# Patient Record
Sex: FEMALE | Race: WHITE | NOT HISPANIC OR LATINO | Employment: OTHER | URBAN - METROPOLITAN AREA
[De-identification: names, ages, dates, MRNs, and addresses within clinical notes are randomized per-mention and may not be internally consistent; named-entity substitution may affect disease eponyms.]

---

## 2023-01-01 ENCOUNTER — APPOINTMENT (OUTPATIENT)
Dept: CARDIOLOGY | Facility: HOSPITAL | Age: 88
DRG: 193 | End: 2023-01-01
Payer: COMMERCIAL

## 2023-01-01 ENCOUNTER — LAB (OUTPATIENT)
Dept: LAB | Facility: LAB | Age: 88
End: 2023-01-01
Payer: COMMERCIAL

## 2023-01-01 ENCOUNTER — APPOINTMENT (OUTPATIENT)
Dept: RADIOLOGY | Facility: HOSPITAL | Age: 88
DRG: 193 | End: 2023-01-01
Payer: COMMERCIAL

## 2023-01-01 ENCOUNTER — DOCUMENTATION (OUTPATIENT)
Dept: INTERNAL MEDICINE | Facility: HOSPITAL | Age: 88
End: 2023-01-01
Payer: COMMERCIAL

## 2023-01-01 ENCOUNTER — TELEPHONE (OUTPATIENT)
Dept: PRIMARY CARE | Facility: CLINIC | Age: 88
End: 2023-01-01
Payer: COMMERCIAL

## 2023-01-01 ENCOUNTER — OFFICE VISIT (OUTPATIENT)
Dept: PRIMARY CARE | Facility: CLINIC | Age: 88
End: 2023-01-01
Payer: COMMERCIAL

## 2023-01-01 ENCOUNTER — HOSPITAL ENCOUNTER (INPATIENT)
Facility: HOSPITAL | Age: 88
LOS: 1 days | DRG: 193 | End: 2023-11-04
Attending: EMERGENCY MEDICINE | Admitting: INTERNAL MEDICINE
Payer: COMMERCIAL

## 2023-01-01 ENCOUNTER — APPOINTMENT (OUTPATIENT)
Dept: PRIMARY CARE | Facility: CLINIC | Age: 88
End: 2023-01-01
Payer: COMMERCIAL

## 2023-01-01 ENCOUNTER — HOSPITAL ENCOUNTER (INPATIENT)
Facility: HOSPITAL | Age: 88
LOS: 3 days | Discharge: HOME | DRG: 193 | End: 2023-10-26
Attending: STUDENT IN AN ORGANIZED HEALTH CARE EDUCATION/TRAINING PROGRAM | Admitting: INTERNAL MEDICINE
Payer: COMMERCIAL

## 2023-01-01 ENCOUNTER — DOCUMENTATION (OUTPATIENT)
Dept: PRIMARY CARE | Facility: CLINIC | Age: 88
End: 2023-01-01
Payer: COMMERCIAL

## 2023-01-01 VITALS
OXYGEN SATURATION: 91 % | RESPIRATION RATE: 18 BRPM | HEIGHT: 62 IN | DIASTOLIC BLOOD PRESSURE: 72 MMHG | HEART RATE: 78 BPM | BODY MASS INDEX: 27.87 KG/M2 | TEMPERATURE: 98.4 F | WEIGHT: 151.46 LBS | SYSTOLIC BLOOD PRESSURE: 156 MMHG

## 2023-01-01 VITALS
SYSTOLIC BLOOD PRESSURE: 125 MMHG | DIASTOLIC BLOOD PRESSURE: 82 MMHG | RESPIRATION RATE: 20 BRPM | TEMPERATURE: 97.3 F | BODY MASS INDEX: 25 KG/M2 | WEIGHT: 141.09 LBS | HEART RATE: 87 BPM | HEIGHT: 63 IN | OXYGEN SATURATION: 94 %

## 2023-01-01 VITALS
OXYGEN SATURATION: 96 % | WEIGHT: 154 LBS | HEART RATE: 68 BPM | RESPIRATION RATE: 17 BRPM | HEIGHT: 62 IN | DIASTOLIC BLOOD PRESSURE: 84 MMHG | BODY MASS INDEX: 28.34 KG/M2 | SYSTOLIC BLOOD PRESSURE: 126 MMHG | TEMPERATURE: 97.3 F

## 2023-01-01 VITALS
SYSTOLIC BLOOD PRESSURE: 150 MMHG | HEART RATE: 62 BPM | OXYGEN SATURATION: 97 % | TEMPERATURE: 97 F | RESPIRATION RATE: 16 BRPM | DIASTOLIC BLOOD PRESSURE: 82 MMHG

## 2023-01-01 VITALS
TEMPERATURE: 98.2 F | OXYGEN SATURATION: 99 % | WEIGHT: 150 LBS | SYSTOLIC BLOOD PRESSURE: 124 MMHG | BODY MASS INDEX: 30.04 KG/M2 | DIASTOLIC BLOOD PRESSURE: 80 MMHG | RESPIRATION RATE: 17 BRPM | HEART RATE: 68 BPM

## 2023-01-01 DIAGNOSIS — R60.0 BILATERAL LEG EDEMA: ICD-10-CM

## 2023-01-01 DIAGNOSIS — Z00.00 ROUTINE GENERAL MEDICAL EXAMINATION AT HEALTH CARE FACILITY: ICD-10-CM

## 2023-01-01 DIAGNOSIS — E78.2 MIXED HYPERLIPIDEMIA: Primary | ICD-10-CM

## 2023-01-01 DIAGNOSIS — M71.21 SYNOVIAL CYST OF RIGHT POPLITEAL SPACE: ICD-10-CM

## 2023-01-01 DIAGNOSIS — K21.9 GASTROESOPHAGEAL REFLUX DISEASE WITHOUT ESOPHAGITIS: ICD-10-CM

## 2023-01-01 DIAGNOSIS — M79.604 PAIN IN RIGHT LEG: ICD-10-CM

## 2023-01-01 DIAGNOSIS — M79.89 RIGHT LEG SWELLING: ICD-10-CM

## 2023-01-01 DIAGNOSIS — Z00.00 HEALTHCARE MAINTENANCE: ICD-10-CM

## 2023-01-01 DIAGNOSIS — I87.2 VENOUS INSUFFICIENCY: ICD-10-CM

## 2023-01-01 DIAGNOSIS — R53.1 WEAKNESS: Primary | ICD-10-CM

## 2023-01-01 DIAGNOSIS — L03.115 CELLULITIS OF RIGHT LOWER EXTREMITY: ICD-10-CM

## 2023-01-01 DIAGNOSIS — J18.9 COMMUNITY ACQUIRED PNEUMONIA, UNSPECIFIED LATERALITY: ICD-10-CM

## 2023-01-01 DIAGNOSIS — I20.9 ANGINA PECTORIS (CMS-HCC): ICD-10-CM

## 2023-01-01 DIAGNOSIS — J18.9 PNEUMONIA, UNSPECIFIED ORGANISM: Primary | ICD-10-CM

## 2023-01-01 DIAGNOSIS — I50.30: ICD-10-CM

## 2023-01-01 DIAGNOSIS — F33.9 DEPRESSION, RECURRENT (CMS-HCC): ICD-10-CM

## 2023-01-01 DIAGNOSIS — Z00.00 ROUTINE GENERAL MEDICAL EXAMINATION AT HEALTH CARE FACILITY: Primary | ICD-10-CM

## 2023-01-01 DIAGNOSIS — R79.89 ELEVATED BRAIN NATRIURETIC PEPTIDE (BNP) LEVEL: ICD-10-CM

## 2023-01-01 DIAGNOSIS — I11.0: ICD-10-CM

## 2023-01-01 DIAGNOSIS — J96.11 CHRONIC RESPIRATORY FAILURE WITH HYPOXIA (MULTI): ICD-10-CM

## 2023-01-01 DIAGNOSIS — F41.9 ANXIETY: ICD-10-CM

## 2023-01-01 DIAGNOSIS — E87.6 HYPOKALEMIA: Primary | ICD-10-CM

## 2023-01-01 DIAGNOSIS — F41.9 ANXIETY: Primary | ICD-10-CM

## 2023-01-01 DIAGNOSIS — R79.89 ELEVATED TROPONIN: Primary | ICD-10-CM

## 2023-01-01 DIAGNOSIS — N39.0 UTI (URINARY TRACT INFECTION), UNCOMPLICATED: ICD-10-CM

## 2023-01-01 DIAGNOSIS — L03.115 CELLULITIS OF RIGHT LOWER EXTREMITY: Primary | ICD-10-CM

## 2023-01-01 DIAGNOSIS — N39.0 RECURRENT UTI: Primary | ICD-10-CM

## 2023-01-01 DIAGNOSIS — J44.9 CHRONIC OBSTRUCTIVE PULMONARY DISEASE, UNSPECIFIED COPD TYPE (MULTI): ICD-10-CM

## 2023-01-01 LAB
ALANINE AMINOTRANSFERASE (SGPT) (U/L) IN SER/PLAS: 8 U/L (ref 7–45)
ALBUMIN (G/DL) IN SER/PLAS: 3.4 G/DL (ref 3.4–5)
ALBUMIN SERPL BCP-MCNC: 3 G/DL (ref 3.4–5)
ALBUMIN SERPL BCP-MCNC: 3.6 G/DL (ref 3.4–5)
ALKALINE PHOSPHATASE (U/L) IN SER/PLAS: 86 U/L (ref 33–136)
ALP SERPL-CCNC: 54 U/L (ref 33–136)
ALP SERPL-CCNC: 79 U/L (ref 33–136)
ALT SERPL W P-5'-P-CCNC: 11 U/L (ref 7–45)
ALT SERPL W P-5'-P-CCNC: 16 U/L (ref 7–45)
AMMONIA PLAS-SCNC: 18 UMOL/L (ref 16–53)
ANION GAP IN SER/PLAS: 16 MMOL/L (ref 10–20)
ANION GAP SERPL CALC-SCNC: 13 MMOL/L (ref 10–20)
ANION GAP SERPL CALC-SCNC: 14 MMOL/L (ref 10–20)
ANION GAP SERPL CALC-SCNC: 14 MMOL/L (ref 10–20)
ANION GAP SERPL CALC-SCNC: 21 MMOL/L (ref 10–20)
APPEARANCE UR: ABNORMAL
ASPARTATE AMINOTRANSFERASE (SGOT) (U/L) IN SER/PLAS: 17 U/L (ref 9–39)
AST SERPL W P-5'-P-CCNC: 12 U/L (ref 9–39)
AST SERPL W P-5'-P-CCNC: 17 U/L (ref 9–39)
ATRIAL RATE: 78 BPM
BACTERIA #/AREA URNS AUTO: ABNORMAL /HPF
BACTERIA SPEC RESP CULT: ABNORMAL
BACTERIA UR CULT: ABNORMAL
BASOPHILS # BLD AUTO: 0.02 X10*3/UL (ref 0–0.1)
BASOPHILS # BLD MANUAL: 0 X10*3/UL (ref 0–0.1)
BASOPHILS NFR BLD AUTO: 0.1 %
BASOPHILS NFR BLD MANUAL: 0 %
BILIRUB SERPL-MCNC: 1 MG/DL (ref 0–1.2)
BILIRUB SERPL-MCNC: 1.2 MG/DL (ref 0–1.2)
BILIRUB UR STRIP.AUTO-MCNC: NEGATIVE MG/DL
BILIRUBIN TOTAL (MG/DL) IN SER/PLAS: 0.7 MG/DL (ref 0–1.2)
BNP SERPL-MCNC: 928 PG/ML (ref 0–99)
BNP SERPL-MCNC: 971 PG/ML (ref 0–99)
BUN SERPL-MCNC: 13 MG/DL (ref 6–23)
BUN SERPL-MCNC: 13 MG/DL (ref 6–23)
BUN SERPL-MCNC: 18 MG/DL (ref 6–23)
BUN SERPL-MCNC: 26 MG/DL (ref 6–23)
BURR CELLS BLD QL SMEAR: ABNORMAL
CALCIUM (MG/DL) IN SER/PLAS: 8.6 MG/DL (ref 8.6–10.3)
CALCIUM SERPL-MCNC: 7.6 MG/DL (ref 8.6–10.3)
CALCIUM SERPL-MCNC: 7.7 MG/DL (ref 8.6–10.3)
CALCIUM SERPL-MCNC: 8.6 MG/DL (ref 8.6–10.3)
CALCIUM SERPL-MCNC: 9.2 MG/DL (ref 8.6–10.3)
CARBON DIOXIDE, TOTAL (MMOL/L) IN SER/PLAS: 22 MMOL/L (ref 21–32)
CARDIAC TROPONIN I PNL SERPL HS: 112 NG/L (ref 0–13)
CARDIAC TROPONIN I PNL SERPL HS: 120 NG/L (ref 0–13)
CARDIAC TROPONIN I PNL SERPL HS: 125 NG/L (ref 0–13)
CARDIAC TROPONIN I PNL SERPL HS: 20 NG/L (ref 0–13)
CARDIAC TROPONIN I PNL SERPL HS: 22 NG/L (ref 0–13)
CHLORIDE (MMOL/L) IN SER/PLAS: 105 MMOL/L (ref 98–107)
CHLORIDE SERPL-SCNC: 104 MMOL/L (ref 98–107)
CHLORIDE SERPL-SCNC: 106 MMOL/L (ref 98–107)
CHLORIDE SERPL-SCNC: 107 MMOL/L (ref 98–107)
CHLORIDE SERPL-SCNC: 108 MMOL/L (ref 98–107)
CHOLESTEROL (MG/DL) IN SER/PLAS: 126 MG/DL (ref 0–199)
CHOLESTEROL IN HDL (MG/DL) IN SER/PLAS: 61.8 MG/DL
CHOLESTEROL/HDL RATIO: 2
CO2 SERPL-SCNC: 18 MMOL/L (ref 21–32)
CO2 SERPL-SCNC: 21 MMOL/L (ref 21–32)
CO2 SERPL-SCNC: 22 MMOL/L (ref 21–32)
CO2 SERPL-SCNC: 24 MMOL/L (ref 21–32)
COLOR UR: ABNORMAL
CREAT SERPL-MCNC: 0.65 MG/DL (ref 0.5–1.05)
CREAT SERPL-MCNC: 0.69 MG/DL (ref 0.5–1.05)
CREAT SERPL-MCNC: 0.82 MG/DL (ref 0.5–1.05)
CREAT SERPL-MCNC: 0.93 MG/DL (ref 0.5–1.05)
CREATININE (MG/DL) IN SER/PLAS: 0.7 MG/DL (ref 0.5–1.05)
EJECTION FRACTION APICAL 4 CHAMBER: 56
EOSINOPHIL # BLD AUTO: 0 X10*3/UL (ref 0–0.4)
EOSINOPHIL # BLD MANUAL: 0.15 X10*3/UL (ref 0–0.4)
EOSINOPHIL NFR BLD AUTO: 0 %
EOSINOPHIL NFR BLD MANUAL: 2 %
ERYTHROCYTE DISTRIBUTION WIDTH (RATIO) BY AUTOMATED COUNT: 14.8 % (ref 11.5–14.5)
ERYTHROCYTE MEAN CORPUSCULAR HEMOGLOBIN CONCENTRATION (G/DL) BY AUTOMATED: 32.3 G/DL (ref 32–36)
ERYTHROCYTE MEAN CORPUSCULAR VOLUME (FL) BY AUTOMATED COUNT: 99 FL (ref 80–100)
ERYTHROCYTE [DISTWIDTH] IN BLOOD BY AUTOMATED COUNT: 14.9 % (ref 11.5–14.5)
ERYTHROCYTE [DISTWIDTH] IN BLOOD BY AUTOMATED COUNT: 15.1 % (ref 11.5–14.5)
ERYTHROCYTE [DISTWIDTH] IN BLOOD BY AUTOMATED COUNT: 15.3 % (ref 11.5–14.5)
ERYTHROCYTES (10*6/UL) IN BLOOD BY AUTOMATED COUNT: 4.4 X10E12/L (ref 4–5.2)
FLUAV RNA RESP QL NAA+PROBE: NOT DETECTED
FLUAV RNA RESP QL NAA+PROBE: NOT DETECTED
FLUBV RNA RESP QL NAA+PROBE: NOT DETECTED
FLUBV RNA RESP QL NAA+PROBE: NOT DETECTED
GFR FEMALE: 81 ML/MIN/1.73M2
GFR SERPL CREATININE-BSD FRML MDRD: 58 ML/MIN/1.73M*2
GFR SERPL CREATININE-BSD FRML MDRD: 68 ML/MIN/1.73M*2
GFR SERPL CREATININE-BSD FRML MDRD: 82 ML/MIN/1.73M*2
GFR SERPL CREATININE-BSD FRML MDRD: 83 ML/MIN/1.73M*2
GLUCOSE (MG/DL) IN SER/PLAS: 96 MG/DL (ref 74–99)
GLUCOSE SERPL-MCNC: 102 MG/DL (ref 74–99)
GLUCOSE SERPL-MCNC: 111 MG/DL (ref 74–99)
GLUCOSE SERPL-MCNC: 118 MG/DL (ref 74–99)
GLUCOSE SERPL-MCNC: 86 MG/DL (ref 74–99)
GLUCOSE UR STRIP.AUTO-MCNC: NEGATIVE MG/DL
GRAM STN SPEC: ABNORMAL
HCT VFR BLD AUTO: 34.4 % (ref 36–46)
HCT VFR BLD AUTO: 35.8 % (ref 36–46)
HCT VFR BLD AUTO: 36.4 % (ref 36–46)
HCT VFR BLD AUTO: 38.7 % (ref 36–46)
HCT VFR BLD AUTO: 40.1 % (ref 36–46)
HEMATOCRIT (%) IN BLOOD BY AUTOMATED COUNT: 43.4 % (ref 36–46)
HEMOGLOBIN (G/DL) IN BLOOD: 14 G/DL (ref 12–16)
HGB BLD-MCNC: 11.2 G/DL (ref 12–16)
HGB BLD-MCNC: 11.6 G/DL (ref 12–16)
HGB BLD-MCNC: 11.8 G/DL (ref 12–16)
HGB BLD-MCNC: 12.7 G/DL (ref 12–16)
HGB BLD-MCNC: 12.7 G/DL (ref 12–16)
HOLD SPECIMEN: NORMAL
IMM GRANULOCYTES # BLD AUTO: 0.05 X10*3/UL (ref 0–0.5)
IMM GRANULOCYTES # BLD AUTO: 0.26 X10*3/UL (ref 0–0.5)
IMM GRANULOCYTES NFR BLD AUTO: 0.7 % (ref 0–0.9)
IMM GRANULOCYTES NFR BLD AUTO: 1.3 % (ref 0–0.9)
INR PPP: 1.1 (ref 0.9–1.1)
KETONES UR STRIP.AUTO-MCNC: NEGATIVE MG/DL
LDL: 43 MG/DL (ref 0–99)
LEGIONELLA AG UR QL: NEGATIVE
LEUKOCYTE ESTERASE UR QL STRIP.AUTO: ABNORMAL
LEUKOCYTES (10*3/UL) IN BLOOD BY AUTOMATED COUNT: 7 X10E9/L (ref 4.4–11.3)
LYMPHOCYTES # BLD AUTO: 0.36 X10*3/UL (ref 0.8–3)
LYMPHOCYTES # BLD MANUAL: 1 X10*3/UL (ref 0.8–3)
LYMPHOCYTES NFR BLD AUTO: 1.7 %
LYMPHOCYTES NFR BLD MANUAL: 13 %
MAGNESIUM SERPL-MCNC: 1.65 MG/DL (ref 1.6–2.4)
MAGNESIUM SERPL-MCNC: 1.67 MG/DL (ref 1.6–2.4)
MAGNESIUM SERPL-MCNC: 2.32 MG/DL (ref 1.6–2.4)
MCH RBC QN AUTO: 30.8 PG (ref 26–34)
MCH RBC QN AUTO: 30.9 PG (ref 26–34)
MCH RBC QN AUTO: 31 PG (ref 26–34)
MCH RBC QN AUTO: 31.2 PG (ref 26–34)
MCH RBC QN AUTO: 31.4 PG (ref 26–34)
MCHC RBC AUTO-ENTMCNC: 31.7 G/DL (ref 32–36)
MCHC RBC AUTO-ENTMCNC: 32.4 G/DL (ref 32–36)
MCHC RBC AUTO-ENTMCNC: 32.4 G/DL (ref 32–36)
MCHC RBC AUTO-ENTMCNC: 32.6 G/DL (ref 32–36)
MCHC RBC AUTO-ENTMCNC: 32.8 G/DL (ref 32–36)
MCV RBC AUTO: 95 FL (ref 80–100)
MCV RBC AUTO: 97 FL (ref 80–100)
MCV RBC AUTO: 97 FL (ref 80–100)
MONOCYTES # BLD AUTO: 0.61 X10*3/UL (ref 0.05–0.8)
MONOCYTES # BLD MANUAL: 0.31 X10*3/UL (ref 0.05–0.8)
MONOCYTES NFR BLD AUTO: 3 %
MONOCYTES NFR BLD MANUAL: 4 %
MRSA DNA SPEC QL NAA+PROBE: NOT DETECTED
NEUTROPHILS # BLD AUTO: 19.34 X10*3/UL (ref 1.6–5.5)
NEUTROPHILS # BLD MANUAL: 6.24 X10*3/UL (ref 1.6–5.5)
NEUTROPHILS NFR BLD AUTO: 93.9 %
NEUTS BAND # BLD MANUAL: 0.08 X10*3/UL (ref 0–0.5)
NEUTS BAND NFR BLD MANUAL: 1 %
NEUTS SEG # BLD MANUAL: 6.16 X10*3/UL (ref 1.6–5)
NEUTS SEG NFR BLD MANUAL: 80 %
NITRITE UR QL STRIP.AUTO: NEGATIVE
NRBC BLD-RTO: 0 /100 WBCS (ref 0–0)
OVALOCYTES BLD QL SMEAR: ABNORMAL
P AXIS: 44 DEGREES
P OFFSET: 189 MS
P ONSET: 147 MS
PH UR STRIP.AUTO: 5 [PH]
PHOSPHATE SERPL-MCNC: 2.2 MG/DL (ref 2.5–4.9)
PHOSPHATE SERPL-MCNC: 2.9 MG/DL (ref 2.5–4.9)
PLATELET # BLD AUTO: 220 X10*3/UL (ref 150–450)
PLATELET # BLD AUTO: 222 X10*3/UL (ref 150–450)
PLATELET # BLD AUTO: 223 X10*3/UL (ref 150–450)
PLATELET # BLD AUTO: 246 X10*3/UL (ref 150–450)
PLATELET # BLD AUTO: 288 X10*3/UL (ref 150–450)
PLATELETS (10*3/UL) IN BLOOD AUTOMATED COUNT: 323 X10E9/L (ref 150–450)
PMV BLD AUTO: 11.3 FL (ref 7.5–11.5)
PMV BLD AUTO: 11.4 FL (ref 7.5–11.5)
PMV BLD AUTO: 11.4 FL (ref 7.5–11.5)
PMV BLD AUTO: 11.6 FL (ref 7.5–11.5)
POTASSIUM (MMOL/L) IN SER/PLAS: 3.6 MMOL/L (ref 3.5–5.3)
POTASSIUM SERPL-SCNC: 3 MMOL/L (ref 3.5–5.3)
POTASSIUM SERPL-SCNC: 3.1 MMOL/L (ref 3.5–5.3)
POTASSIUM SERPL-SCNC: 3.5 MMOL/L (ref 3.5–5.3)
POTASSIUM SERPL-SCNC: 3.7 MMOL/L (ref 3.5–5.3)
POTASSIUM SERPL-SCNC: 4 MMOL/L (ref 3.5–5.3)
PR INTERVAL: 144 MS
PROCALCITONIN SERPL-MCNC: 0.38 NG/ML
PROT SERPL-MCNC: 5.9 G/DL (ref 6.4–8.2)
PROT SERPL-MCNC: 6.4 G/DL (ref 6.4–8.2)
PROT UR STRIP.AUTO-MCNC: ABNORMAL MG/DL
PROTEIN TOTAL: 6.3 G/DL (ref 6.4–8.2)
PROTHROMBIN TIME: 11.9 SECONDS (ref 9.8–12.8)
Q ONSET: 219 MS
QRS COUNT: 13 BEATS
QRS DURATION: 92 MS
QT INTERVAL: 408 MS
QTC CALCULATION(BAZETT): 465 MS
QTC FREDERICIA: 445 MS
R AXIS: 21 DEGREES
RBC # BLD AUTO: 3.61 X10*6/UL (ref 4–5.2)
RBC # BLD AUTO: 3.76 X10*6/UL (ref 4–5.2)
RBC # BLD AUTO: 3.76 X10*6/UL (ref 4–5.2)
RBC # BLD AUTO: 4.07 X10*6/UL (ref 4–5.2)
RBC # BLD AUTO: 4.12 X10*6/UL (ref 4–5.2)
RBC # UR STRIP.AUTO: ABNORMAL /UL
RBC #/AREA URNS AUTO: ABNORMAL /HPF
RBC MORPH BLD: ABNORMAL
RSV RNA RESP QL NAA+PROBE: NOT DETECTED
S PNEUM AG UR QL: NEGATIVE
SARS-COV-2 RNA RESP QL NAA+PROBE: NOT DETECTED
SARS-COV-2 RNA RESP QL NAA+PROBE: NOT DETECTED
SODIUM (MMOL/L) IN SER/PLAS: 139 MMOL/L (ref 136–145)
SODIUM SERPL-SCNC: 139 MMOL/L (ref 136–145)
SODIUM SERPL-SCNC: 139 MMOL/L (ref 136–145)
SODIUM SERPL-SCNC: 140 MMOL/L (ref 136–145)
SODIUM SERPL-SCNC: 140 MMOL/L (ref 136–145)
SP GR UR STRIP.AUTO: 1.02
SQUAMOUS #/AREA URNS AUTO: ABNORMAL /HPF
T AXIS: 41 DEGREES
T OFFSET: 423 MS
TOTAL CELLS COUNTED BLD: 100
TRIGLYCERIDE (MG/DL) IN SER/PLAS: 105 MG/DL (ref 0–149)
UREA NITROGEN (MG/DL) IN SER/PLAS: 9 MG/DL (ref 6–23)
UROBILINOGEN UR STRIP.AUTO-MCNC: <2 MG/DL
VENTRICULAR RATE: 78 BPM
VLDL: 21 MG/DL (ref 0–40)
WBC # BLD AUTO: 20.6 X10*3/UL (ref 4.4–11.3)
WBC # BLD AUTO: 4.3 X10*3/UL (ref 4.4–11.3)
WBC # BLD AUTO: 4.8 X10*3/UL (ref 4.4–11.3)
WBC # BLD AUTO: 5.1 X10*3/UL (ref 4.4–11.3)
WBC # BLD AUTO: 7.7 X10*3/UL (ref 4.4–11.3)
WBC #/AREA URNS AUTO: ABNORMAL /HPF

## 2023-01-01 PROCEDURE — 83880 ASSAY OF NATRIURETIC PEPTIDE: CPT | Performed by: PHYSICIAN ASSISTANT

## 2023-01-01 PROCEDURE — 2500000001 HC RX 250 WO HCPCS SELF ADMINISTERED DRUGS (ALT 637 FOR MEDICARE OP): Performed by: INTERNAL MEDICINE

## 2023-01-01 PROCEDURE — 82140 ASSAY OF AMMONIA: CPT | Performed by: STUDENT IN AN ORGANIZED HEALTH CARE EDUCATION/TRAINING PROGRAM

## 2023-01-01 PROCEDURE — 97161 PT EVAL LOW COMPLEX 20 MIN: CPT | Mod: GP

## 2023-01-01 PROCEDURE — 1170F FXNL STATUS ASSESSED: CPT | Performed by: FAMILY MEDICINE

## 2023-01-01 PROCEDURE — 80053 COMPREHEN METABOLIC PANEL: CPT

## 2023-01-01 PROCEDURE — 36415 COLL VENOUS BLD VENIPUNCTURE: CPT | Performed by: INTERNAL MEDICINE

## 2023-01-01 PROCEDURE — 85025 COMPLETE CBC W/AUTO DIFF WBC: CPT

## 2023-01-01 PROCEDURE — 1160F RVW MEDS BY RX/DR IN RCRD: CPT

## 2023-01-01 PROCEDURE — 2500000004 HC RX 250 GENERAL PHARMACY W/ HCPCS (ALT 636 FOR OP/ED): Performed by: INTERNAL MEDICINE

## 2023-01-01 PROCEDURE — 2500000005 HC RX 250 GENERAL PHARMACY W/O HCPCS: Performed by: STUDENT IN AN ORGANIZED HEALTH CARE EDUCATION/TRAINING PROGRAM

## 2023-01-01 PROCEDURE — 87205 SMEAR GRAM STAIN: CPT | Mod: STJLAB | Performed by: INTERNAL MEDICINE

## 2023-01-01 PROCEDURE — 97165 OT EVAL LOW COMPLEX 30 MIN: CPT | Mod: GO

## 2023-01-01 PROCEDURE — 83735 ASSAY OF MAGNESIUM: CPT | Performed by: STUDENT IN AN ORGANIZED HEALTH CARE EDUCATION/TRAINING PROGRAM

## 2023-01-01 PROCEDURE — 99285 EMERGENCY DEPT VISIT HI MDM: CPT | Performed by: EMERGENCY MEDICINE

## 2023-01-01 PROCEDURE — 85027 COMPLETE CBC AUTOMATED: CPT | Performed by: STUDENT IN AN ORGANIZED HEALTH CARE EDUCATION/TRAINING PROGRAM

## 2023-01-01 PROCEDURE — 96361 HYDRATE IV INFUSION ADD-ON: CPT

## 2023-01-01 PROCEDURE — 80061 LIPID PANEL: CPT

## 2023-01-01 PROCEDURE — 93010 ELECTROCARDIOGRAM REPORT: CPT | Performed by: PHYSICIAN ASSISTANT

## 2023-01-01 PROCEDURE — 99233 SBSQ HOSP IP/OBS HIGH 50: CPT | Performed by: STUDENT IN AN ORGANIZED HEALTH CARE EDUCATION/TRAINING PROGRAM

## 2023-01-01 PROCEDURE — 70450 CT HEAD/BRAIN W/O DYE: CPT | Performed by: RADIOLOGY

## 2023-01-01 PROCEDURE — 3074F SYST BP LT 130 MM HG: CPT

## 2023-01-01 PROCEDURE — 85027 COMPLETE CBC AUTOMATED: CPT

## 2023-01-01 PROCEDURE — 85610 PROTHROMBIN TIME: CPT | Performed by: STUDENT IN AN ORGANIZED HEALTH CARE EDUCATION/TRAINING PROGRAM

## 2023-01-01 PROCEDURE — 1159F MED LIST DOCD IN RCRD: CPT

## 2023-01-01 PROCEDURE — 2500000005 HC RX 250 GENERAL PHARMACY W/O HCPCS: Performed by: EMERGENCY MEDICINE

## 2023-01-01 PROCEDURE — 87899 AGENT NOS ASSAY W/OPTIC: CPT | Performed by: INTERNAL MEDICINE

## 2023-01-01 PROCEDURE — 83880 ASSAY OF NATRIURETIC PEPTIDE: CPT

## 2023-01-01 PROCEDURE — 87186 SC STD MICRODIL/AGAR DIL: CPT | Mod: STJLAB | Performed by: PHYSICIAN ASSISTANT

## 2023-01-01 PROCEDURE — 85027 COMPLETE CBC AUTOMATED: CPT | Performed by: INTERNAL MEDICINE

## 2023-01-01 PROCEDURE — 84484 ASSAY OF TROPONIN QUANT: CPT | Performed by: STUDENT IN AN ORGANIZED HEALTH CARE EDUCATION/TRAINING PROGRAM

## 2023-01-01 PROCEDURE — 3074F SYST BP LT 130 MM HG: CPT | Performed by: FAMILY MEDICINE

## 2023-01-01 PROCEDURE — 84145 PROCALCITONIN (PCT): CPT | Performed by: INTERNAL MEDICINE

## 2023-01-01 PROCEDURE — 2500000004 HC RX 250 GENERAL PHARMACY W/ HCPCS (ALT 636 FOR OP/ED)

## 2023-01-01 PROCEDURE — 1160F RVW MEDS BY RX/DR IN RCRD: CPT | Performed by: FAMILY MEDICINE

## 2023-01-01 PROCEDURE — 3079F DIAST BP 80-89 MM HG: CPT

## 2023-01-01 PROCEDURE — 1036F TOBACCO NON-USER: CPT

## 2023-01-01 PROCEDURE — 93000 ELECTROCARDIOGRAM COMPLETE: CPT | Performed by: FAMILY MEDICINE

## 2023-01-01 PROCEDURE — 81001 URINALYSIS AUTO W/SCOPE: CPT | Performed by: PHYSICIAN ASSISTANT

## 2023-01-01 PROCEDURE — 87899 AGENT NOS ASSAY W/OPTIC: CPT | Mod: STJLAB | Performed by: INTERNAL MEDICINE

## 2023-01-01 PROCEDURE — 96375 TX/PRO/DX INJ NEW DRUG ADDON: CPT

## 2023-01-01 PROCEDURE — 2500000002 HC RX 250 W HCPCS SELF ADMINISTERED DRUGS (ALT 637 FOR MEDICARE OP, ALT 636 FOR OP/ED): Performed by: EMERGENCY MEDICINE

## 2023-01-01 PROCEDURE — 84484 ASSAY OF TROPONIN QUANT: CPT | Performed by: PHYSICIAN ASSISTANT

## 2023-01-01 PROCEDURE — 3079F DIAST BP 80-89 MM HG: CPT | Performed by: FAMILY MEDICINE

## 2023-01-01 PROCEDURE — 73564 X-RAY EXAM KNEE 4 OR MORE: CPT | Mod: RIGHT SIDE | Performed by: RADIOLOGY

## 2023-01-01 PROCEDURE — 97535 SELF CARE MNGMENT TRAINING: CPT | Mod: GO

## 2023-01-01 PROCEDURE — 71046 X-RAY EXAM CHEST 2 VIEWS: CPT | Mod: FY,FR

## 2023-01-01 PROCEDURE — 70450 CT HEAD/BRAIN W/O DYE: CPT | Mod: MG

## 2023-01-01 PROCEDURE — 87449 NOS EACH ORGANISM AG IA: CPT | Mod: STJLAB | Performed by: INTERNAL MEDICINE

## 2023-01-01 PROCEDURE — 87637 SARSCOV2&INF A&B&RSV AMP PRB: CPT | Performed by: INTERNAL MEDICINE

## 2023-01-01 PROCEDURE — 96367 TX/PROPH/DG ADDL SEQ IV INF: CPT

## 2023-01-01 PROCEDURE — 2550000001 HC RX 255 CONTRASTS: Performed by: PHYSICIAN ASSISTANT

## 2023-01-01 PROCEDURE — 80048 BASIC METABOLIC PNL TOTAL CA: CPT | Performed by: INTERNAL MEDICINE

## 2023-01-01 PROCEDURE — 96372 THER/PROPH/DIAG INJ SC/IM: CPT | Performed by: INTERNAL MEDICINE

## 2023-01-01 PROCEDURE — 87636 SARSCOV2 & INF A&B AMP PRB: CPT

## 2023-01-01 PROCEDURE — 1125F AMNT PAIN NOTED PAIN PRSNT: CPT

## 2023-01-01 PROCEDURE — 36415 COLL VENOUS BLD VENIPUNCTURE: CPT | Performed by: STUDENT IN AN ORGANIZED HEALTH CARE EDUCATION/TRAINING PROGRAM

## 2023-01-01 PROCEDURE — 2500000004 HC RX 250 GENERAL PHARMACY W/ HCPCS (ALT 636 FOR OP/ED): Performed by: STUDENT IN AN ORGANIZED HEALTH CARE EDUCATION/TRAINING PROGRAM

## 2023-01-01 PROCEDURE — 71275 CT ANGIOGRAPHY CHEST: CPT | Mod: FOREIGN READ | Performed by: RADIOLOGY

## 2023-01-01 PROCEDURE — 36415 COLL VENOUS BLD VENIPUNCTURE: CPT

## 2023-01-01 PROCEDURE — 96365 THER/PROPH/DIAG IV INF INIT: CPT

## 2023-01-01 PROCEDURE — 99239 HOSP IP/OBS DSCHRG MGMT >30: CPT | Performed by: INTERNAL MEDICINE

## 2023-01-01 PROCEDURE — 93306 TTE W/DOPPLER COMPLETE: CPT

## 2023-01-01 PROCEDURE — 93005 ELECTROCARDIOGRAM TRACING: CPT

## 2023-01-01 PROCEDURE — 71046 X-RAY EXAM CHEST 2 VIEWS: CPT | Mod: FOREIGN READ | Performed by: RADIOLOGY

## 2023-01-01 PROCEDURE — 97116 GAIT TRAINING THERAPY: CPT | Mod: GP,CQ

## 2023-01-01 PROCEDURE — 99213 OFFICE O/P EST LOW 20 MIN: CPT

## 2023-01-01 PROCEDURE — 99239 HOSP IP/OBS DSCHRG MGMT >30: CPT | Performed by: STUDENT IN AN ORGANIZED HEALTH CARE EDUCATION/TRAINING PROGRAM

## 2023-01-01 PROCEDURE — 1100000001 HC PRIVATE ROOM DAILY

## 2023-01-01 PROCEDURE — 51701 INSERT BLADDER CATHETER: CPT

## 2023-01-01 PROCEDURE — 99285 EMERGENCY DEPT VISIT HI MDM: CPT | Performed by: STUDENT IN AN ORGANIZED HEALTH CARE EDUCATION/TRAINING PROGRAM

## 2023-01-01 PROCEDURE — 2500000004 HC RX 250 GENERAL PHARMACY W/ HCPCS (ALT 636 FOR OP/ED): Performed by: PHYSICIAN ASSISTANT

## 2023-01-01 PROCEDURE — 84132 ASSAY OF SERUM POTASSIUM: CPT | Performed by: STUDENT IN AN ORGANIZED HEALTH CARE EDUCATION/TRAINING PROGRAM

## 2023-01-01 PROCEDURE — 71275 CT ANGIOGRAPHY CHEST: CPT | Mod: MG

## 2023-01-01 PROCEDURE — 99285 EMERGENCY DEPT VISIT HI MDM: CPT | Mod: 25 | Performed by: EMERGENCY MEDICINE

## 2023-01-01 PROCEDURE — 83735 ASSAY OF MAGNESIUM: CPT

## 2023-01-01 PROCEDURE — 97110 THERAPEUTIC EXERCISES: CPT | Mod: GP,CQ

## 2023-01-01 PROCEDURE — 99215 OFFICE O/P EST HI 40 MIN: CPT

## 2023-01-01 PROCEDURE — 99222 1ST HOSP IP/OBS MODERATE 55: CPT | Performed by: INTERNAL MEDICINE

## 2023-01-01 PROCEDURE — 87641 MR-STAPH DNA AMP PROBE: CPT | Performed by: INTERNAL MEDICINE

## 2023-01-01 PROCEDURE — 84100 ASSAY OF PHOSPHORUS: CPT | Performed by: STUDENT IN AN ORGANIZED HEALTH CARE EDUCATION/TRAINING PROGRAM

## 2023-01-01 PROCEDURE — 85027 COMPLETE CBC AUTOMATED: CPT | Performed by: PHYSICIAN ASSISTANT

## 2023-01-01 PROCEDURE — 80053 COMPREHEN METABOLIC PANEL: CPT | Performed by: PHYSICIAN ASSISTANT

## 2023-01-01 PROCEDURE — 3077F SYST BP >= 140 MM HG: CPT

## 2023-01-01 PROCEDURE — 71045 X-RAY EXAM CHEST 1 VIEW: CPT | Performed by: RADIOLOGY

## 2023-01-01 PROCEDURE — G0439 PPPS, SUBSEQ VISIT: HCPCS | Performed by: FAMILY MEDICINE

## 2023-01-01 PROCEDURE — 36415 COLL VENOUS BLD VENIPUNCTURE: CPT | Performed by: PHYSICIAN ASSISTANT

## 2023-01-01 PROCEDURE — 1157F ADVNC CARE PLAN IN RCRD: CPT

## 2023-01-01 PROCEDURE — 1159F MED LIST DOCD IN RCRD: CPT | Performed by: FAMILY MEDICINE

## 2023-01-01 PROCEDURE — 71045 X-RAY EXAM CHEST 1 VIEW: CPT | Mod: FY

## 2023-01-01 PROCEDURE — 85007 BL SMEAR W/DIFF WBC COUNT: CPT | Performed by: PHYSICIAN ASSISTANT

## 2023-01-01 PROCEDURE — 93971 EXTREMITY STUDY: CPT

## 2023-01-01 PROCEDURE — 73564 X-RAY EXAM KNEE 4 OR MORE: CPT | Mod: RT,FY

## 2023-01-01 PROCEDURE — 99285 EMERGENCY DEPT VISIT HI MDM: CPT | Performed by: PHYSICIAN ASSISTANT

## 2023-01-01 PROCEDURE — 93306 TTE W/DOPPLER COMPLETE: CPT | Performed by: INTERNAL MEDICINE

## 2023-01-01 PROCEDURE — 84484 ASSAY OF TROPONIN QUANT: CPT

## 2023-01-01 PROCEDURE — 96365 THER/PROPH/DIAG IV INF INIT: CPT | Mod: 59

## 2023-01-01 PROCEDURE — 94640 AIRWAY INHALATION TREATMENT: CPT

## 2023-01-01 PROCEDURE — 93971 EXTREMITY STUDY: CPT | Performed by: SURGERY

## 2023-01-01 RX ORDER — IPRATROPIUM BROMIDE AND ALBUTEROL SULFATE 2.5; .5 MG/3ML; MG/3ML
3 SOLUTION RESPIRATORY (INHALATION)
Status: DISCONTINUED | OUTPATIENT
Start: 2023-01-01 | End: 2023-01-01

## 2023-01-01 RX ORDER — VANCOMYCIN HYDROCHLORIDE 1 G/200ML
1 INJECTION, SOLUTION INTRAVENOUS EVERY 24 HOURS
Status: DISCONTINUED | OUTPATIENT
Start: 2023-01-01 | End: 2023-01-01 | Stop reason: HOSPADM

## 2023-01-01 RX ORDER — GUAIFENESIN/DEXTROMETHORPHAN 100-10MG/5
10 SYRUP ORAL EVERY 4 HOURS PRN
Status: DISCONTINUED | OUTPATIENT
Start: 2023-01-01 | End: 2023-01-01 | Stop reason: HOSPADM

## 2023-01-01 RX ORDER — AMOXICILLIN AND CLAVULANATE POTASSIUM 875; 125 MG/1; MG/1
1 TABLET, FILM COATED ORAL 2 TIMES DAILY
Qty: 10 TABLET | Refills: 0 | Status: SHIPPED | OUTPATIENT
Start: 2023-01-01 | End: 2023-01-01 | Stop reason: HOSPADM

## 2023-01-01 RX ORDER — POTASSIUM CHLORIDE 750 MG/1
10 TABLET, EXTENDED RELEASE ORAL DAILY
Qty: 90 TABLET | Refills: 3 | Status: SHIPPED | OUTPATIENT
Start: 2023-01-01 | End: 2023-01-01 | Stop reason: HOSPADM

## 2023-01-01 RX ORDER — ATORVASTATIN CALCIUM 40 MG/1
TABLET, FILM COATED ORAL
Qty: 100 TABLET | Refills: 2 | Status: SHIPPED | OUTPATIENT
Start: 2023-01-01 | End: 2023-01-01 | Stop reason: HOSPADM

## 2023-01-01 RX ORDER — ENOXAPARIN SODIUM 100 MG/ML
40 INJECTION SUBCUTANEOUS EVERY 24 HOURS
Status: DISCONTINUED | OUTPATIENT
Start: 2023-01-01 | End: 2023-01-01 | Stop reason: HOSPADM

## 2023-01-01 RX ORDER — VANCOMYCIN HYDROCHLORIDE 1 G/200ML
1000 INJECTION, SOLUTION INTRAVENOUS ONCE
Status: COMPLETED | OUTPATIENT
Start: 2023-01-01 | End: 2023-01-01

## 2023-01-01 RX ORDER — MIRTAZAPINE 15 MG/1
15 TABLET, FILM COATED ORAL NIGHTLY
Status: DISCONTINUED | OUTPATIENT
Start: 2023-01-01 | End: 2023-01-01 | Stop reason: HOSPADM

## 2023-01-01 RX ORDER — CEPHALEXIN 500 MG/1
500 CAPSULE ORAL 4 TIMES DAILY
Qty: 28 CAPSULE | Refills: 0 | Status: SHIPPED | OUTPATIENT
Start: 2023-01-01 | End: 2023-01-01

## 2023-01-01 RX ORDER — FERROUS SULFATE 325(65) MG
1 TABLET ORAL DAILY
COMMUNITY
End: 2023-01-01 | Stop reason: HOSPADM

## 2023-01-01 RX ORDER — PANTOPRAZOLE SODIUM 40 MG/1
40 TABLET, DELAYED RELEASE ORAL
Status: DISCONTINUED | OUTPATIENT
Start: 2023-01-01 | End: 2023-01-01 | Stop reason: HOSPADM

## 2023-01-01 RX ORDER — METOPROLOL SUCCINATE 25 MG/1
25 TABLET, EXTENDED RELEASE ORAL NIGHTLY
Status: DISCONTINUED | OUTPATIENT
Start: 2023-01-01 | End: 2023-01-01 | Stop reason: HOSPADM

## 2023-01-01 RX ORDER — POTASSIUM CHLORIDE 20 MEQ/1
40 TABLET, EXTENDED RELEASE ORAL ONCE
Status: COMPLETED | OUTPATIENT
Start: 2023-01-01 | End: 2023-01-01

## 2023-01-01 RX ORDER — ENOXAPARIN SODIUM 100 MG/ML
40 INJECTION SUBCUTANEOUS DAILY
Status: DISCONTINUED | OUTPATIENT
Start: 2023-01-01 | End: 2023-01-01 | Stop reason: HOSPADM

## 2023-01-01 RX ORDER — PAROXETINE 10 MG/1
10 TABLET, FILM COATED ORAL DAILY
Qty: 90 TABLET | Refills: 1 | Status: SHIPPED | OUTPATIENT
Start: 2023-01-01 | End: 2023-01-01 | Stop reason: SDUPTHER

## 2023-01-01 RX ORDER — ATORVASTATIN CALCIUM 40 MG/1
40 TABLET, FILM COATED ORAL DAILY
Status: DISCONTINUED | OUTPATIENT
Start: 2023-01-01 | End: 2023-01-01 | Stop reason: HOSPADM

## 2023-01-01 RX ORDER — ALBUTEROL SULFATE 0.83 MG/ML
2.5 SOLUTION RESPIRATORY (INHALATION) EVERY 4 HOURS PRN
Status: DISCONTINUED | OUTPATIENT
Start: 2023-01-01 | End: 2023-01-01 | Stop reason: HOSPADM

## 2023-01-01 RX ORDER — FERROUS SULFATE 325(65) MG
1 TABLET ORAL DAILY
Status: DISCONTINUED | OUTPATIENT
Start: 2023-01-01 | End: 2023-01-01 | Stop reason: HOSPADM

## 2023-01-01 RX ORDER — LANOLIN ALCOHOL/MO/W.PET/CERES
400 CREAM (GRAM) TOPICAL ONCE
Status: COMPLETED | OUTPATIENT
Start: 2023-01-01 | End: 2023-01-01

## 2023-01-01 RX ORDER — POTASSIUM CHLORIDE 20 MEQ/1
10 TABLET, EXTENDED RELEASE ORAL DAILY
Status: DISCONTINUED | OUTPATIENT
Start: 2023-01-01 | End: 2023-01-01 | Stop reason: HOSPADM

## 2023-01-01 RX ORDER — PANTOPRAZOLE SODIUM 40 MG/1
40 TABLET, DELAYED RELEASE ORAL
Qty: 180 TABLET | Refills: 3 | Status: SHIPPED | OUTPATIENT
Start: 2023-01-01 | End: 2023-01-01 | Stop reason: HOSPADM

## 2023-01-01 RX ORDER — SODIUM CHLORIDE, SODIUM LACTATE, POTASSIUM CHLORIDE, CALCIUM CHLORIDE 600; 310; 30; 20 MG/100ML; MG/100ML; MG/100ML; MG/100ML
75 INJECTION, SOLUTION INTRAVENOUS CONTINUOUS
Status: DISCONTINUED | OUTPATIENT
Start: 2023-01-01 | End: 2023-01-01 | Stop reason: HOSPADM

## 2023-01-01 RX ORDER — PAROXETINE 10 MG/1
1 TABLET, FILM COATED ORAL DAILY
COMMUNITY
Start: 2022-06-27 | End: 2023-01-01 | Stop reason: SDUPTHER

## 2023-01-01 RX ORDER — ALBUTEROL SULFATE 90 UG/1
2 AEROSOL, METERED RESPIRATORY (INHALATION) 4 TIMES DAILY PRN
COMMUNITY
End: 2023-01-01 | Stop reason: ENTERED-IN-ERROR

## 2023-01-01 RX ORDER — PAROXETINE 10 MG/1
10 TABLET, FILM COATED ORAL NIGHTLY
COMMUNITY
Start: 2020-03-10 | End: 2023-01-01 | Stop reason: HOSPADM

## 2023-01-01 RX ORDER — ONDANSETRON HYDROCHLORIDE 2 MG/ML
4 INJECTION, SOLUTION INTRAVENOUS ONCE
Status: COMPLETED | OUTPATIENT
Start: 2023-01-01 | End: 2023-01-01

## 2023-01-01 RX ORDER — SODIUM CHLORIDE 9 MG/ML
100 INJECTION, SOLUTION INTRAVENOUS CONTINUOUS
Status: ACTIVE | OUTPATIENT
Start: 2023-01-01 | End: 2023-01-01

## 2023-01-01 RX ORDER — ONDANSETRON HYDROCHLORIDE 2 MG/ML
INJECTION, SOLUTION INTRAVENOUS
Status: COMPLETED
Start: 2023-01-01 | End: 2023-01-01

## 2023-01-01 RX ORDER — IPRATROPIUM BROMIDE AND ALBUTEROL SULFATE 2.5; .5 MG/3ML; MG/3ML
3 SOLUTION RESPIRATORY (INHALATION) ONCE
Status: COMPLETED | OUTPATIENT
Start: 2023-01-01 | End: 2023-01-01

## 2023-01-01 RX ORDER — PAROXETINE 10 MG/1
10 TABLET, FILM COATED ORAL NIGHTLY
Status: DISCONTINUED | OUTPATIENT
Start: 2023-01-01 | End: 2023-01-01 | Stop reason: HOSPADM

## 2023-01-01 RX ORDER — POTASSIUM PHOSPHATE, MONOBASIC AND POTASSIUM PHOSPHATE, DIBASIC 224; 236 MG/ML; MG/ML
15 INJECTION, SOLUTION INTRAVENOUS ONCE
Status: COMPLETED | OUTPATIENT
Start: 2023-01-01 | End: 2023-01-01

## 2023-01-01 RX ORDER — CEFTRIAXONE 2 G/50ML
2 INJECTION, SOLUTION INTRAVENOUS EVERY 24 HOURS
Status: DISCONTINUED | OUTPATIENT
Start: 2023-01-01 | End: 2023-01-01 | Stop reason: HOSPADM

## 2023-01-01 RX ORDER — ONDANSETRON HYDROCHLORIDE 2 MG/ML
4 INJECTION, SOLUTION INTRAVENOUS EVERY 6 HOURS PRN
Status: DISCONTINUED | OUTPATIENT
Start: 2023-01-01 | End: 2023-01-01 | Stop reason: HOSPADM

## 2023-01-01 RX ORDER — CEFTRIAXONE 1 G/50ML
1 INJECTION, SOLUTION INTRAVENOUS ONCE
Status: COMPLETED | OUTPATIENT
Start: 2023-01-01 | End: 2023-01-01

## 2023-01-01 RX ORDER — POTASSIUM CHLORIDE 750 MG/1
10 TABLET, EXTENDED RELEASE ORAL DAILY
COMMUNITY
Start: 2023-01-01 | End: 2023-01-01

## 2023-01-01 RX ORDER — MIRTAZAPINE 15 MG/1
1 TABLET, FILM COATED ORAL NIGHTLY
COMMUNITY
Start: 2022-02-21 | End: 2023-01-01 | Stop reason: HOSPADM

## 2023-01-01 RX ORDER — PAROXETINE 10 MG/1
10 TABLET, FILM COATED ORAL DAILY
Qty: 90 TABLET | Refills: 1 | Status: SHIPPED | OUTPATIENT
Start: 2023-01-01 | End: 2023-01-01 | Stop reason: ALTCHOICE

## 2023-01-01 RX ORDER — POTASSIUM CHLORIDE 20 MEQ/1
40 TABLET, EXTENDED RELEASE ORAL
Status: COMPLETED | OUTPATIENT
Start: 2023-01-01 | End: 2023-01-01

## 2023-01-01 RX ORDER — ACETAMINOPHEN 325 MG/1
650 TABLET ORAL EVERY 6 HOURS PRN
Status: DISCONTINUED | OUTPATIENT
Start: 2023-01-01 | End: 2023-01-01 | Stop reason: HOSPADM

## 2023-01-01 RX ADMIN — GUAIFENESIN SYRUP AND DEXTROMETHORPHAN 10 ML: 100; 10 SYRUP ORAL at 20:57

## 2023-01-01 RX ADMIN — MIRTAZAPINE 15 MG: 15 TABLET, FILM COATED ORAL at 01:22

## 2023-01-01 RX ADMIN — PIPERACILLIN SODIUM AND TAZOBACTAM SODIUM 4.5 G: 4; .5 INJECTION, SOLUTION INTRAVENOUS at 14:14

## 2023-01-01 RX ADMIN — METOPROLOL SUCCINATE 25 MG: 25 TABLET, EXTENDED RELEASE ORAL at 21:00

## 2023-01-01 RX ADMIN — CEFTRIAXONE SODIUM 2 G: 2 INJECTION, SOLUTION INTRAVENOUS at 21:00

## 2023-01-01 RX ADMIN — ATORVASTATIN CALCIUM 40 MG: 40 TABLET, FILM COATED ORAL at 12:40

## 2023-01-01 RX ADMIN — POTASSIUM CHLORIDE 40 MEQ: 1500 TABLET, EXTENDED RELEASE ORAL at 12:41

## 2023-01-01 RX ADMIN — ENOXAPARIN SODIUM 40 MG: 40 INJECTION SUBCUTANEOUS at 21:03

## 2023-01-01 RX ADMIN — CEFTRIAXONE SODIUM 1 G: 1 INJECTION, SOLUTION INTRAVENOUS at 21:22

## 2023-01-01 RX ADMIN — ATORVASTATIN CALCIUM 40 MG: 40 TABLET, FILM COATED ORAL at 09:56

## 2023-01-01 RX ADMIN — METOPROLOL SUCCINATE 25 MG: 25 TABLET, EXTENDED RELEASE ORAL at 01:22

## 2023-01-01 RX ADMIN — ENOXAPARIN SODIUM 40 MG: 40 INJECTION SUBCUTANEOUS at 12:41

## 2023-01-01 RX ADMIN — PANTOPRAZOLE SODIUM 40 MG: 40 TABLET, DELAYED RELEASE ORAL at 18:07

## 2023-01-01 RX ADMIN — ENOXAPARIN SODIUM 40 MG: 40 INJECTION SUBCUTANEOUS at 09:04

## 2023-01-01 RX ADMIN — ONDANSETRON HYDROCHLORIDE 4 MG: 2 INJECTION, SOLUTION INTRAVENOUS at 17:32

## 2023-01-01 RX ADMIN — ACETAMINOPHEN 650 MG: 325 TABLET ORAL at 20:57

## 2023-01-01 RX ADMIN — Medication 6 L/MIN: at 14:05

## 2023-01-01 RX ADMIN — ENOXAPARIN SODIUM 40 MG: 40 INJECTION SUBCUTANEOUS at 09:56

## 2023-01-01 RX ADMIN — POTASSIUM PHOSPHATE, MONOBASIC POTASSIUM PHOSPHATE, DIBASIC 15 MMOL: 224; 236 INJECTION, SOLUTION, CONCENTRATE INTRAVENOUS at 15:32

## 2023-01-01 RX ADMIN — Medication 400 MG: at 12:41

## 2023-01-01 RX ADMIN — IOHEXOL 60 ML: 350 INJECTION, SOLUTION INTRAVENOUS at 18:40

## 2023-01-01 RX ADMIN — AZITHROMYCIN MONOHYDRATE 500 MG: 500 INJECTION, POWDER, LYOPHILIZED, FOR SOLUTION INTRAVENOUS at 23:43

## 2023-01-01 RX ADMIN — AZITHROMYCIN MONOHYDRATE 500 MG: 500 INJECTION, POWDER, LYOPHILIZED, FOR SOLUTION INTRAVENOUS at 22:25

## 2023-01-01 RX ADMIN — PANTOPRAZOLE SODIUM 40 MG: 40 TABLET, DELAYED RELEASE ORAL at 06:10

## 2023-01-01 RX ADMIN — MIRTAZAPINE 15 MG: 15 TABLET, FILM COATED ORAL at 21:15

## 2023-01-01 RX ADMIN — PIPERACILLIN SODIUM AND TAZOBACTAM SODIUM 3.38 G: 3; .375 INJECTION, SOLUTION INTRAVENOUS at 21:04

## 2023-01-01 RX ADMIN — PANTOPRAZOLE SODIUM 40 MG: 40 TABLET, DELAYED RELEASE ORAL at 06:11

## 2023-01-01 RX ADMIN — POTASSIUM CHLORIDE 40 MEQ: 1500 TABLET, EXTENDED RELEASE ORAL at 11:54

## 2023-01-01 RX ADMIN — PAROXETINE 10 MG: 10 TABLET, FILM COATED ORAL at 00:05

## 2023-01-01 RX ADMIN — FERROUS SULFATE TAB 325 MG (65 MG ELEMENTAL FE) 325 MG: 325 (65 FE) TAB at 09:04

## 2023-01-01 RX ADMIN — ONDANSETRON 4 MG: 2 INJECTION INTRAMUSCULAR; INTRAVENOUS at 17:32

## 2023-01-01 RX ADMIN — CEFTRIAXONE SODIUM 2 G: 2 INJECTION, SOLUTION INTRAVENOUS at 21:17

## 2023-01-01 RX ADMIN — ATORVASTATIN CALCIUM 40 MG: 40 TABLET, FILM COATED ORAL at 09:04

## 2023-01-01 RX ADMIN — METOPROLOL SUCCINATE 25 MG: 25 TABLET, EXTENDED RELEASE ORAL at 21:17

## 2023-01-01 RX ADMIN — PAROXETINE 10 MG: 10 TABLET, FILM COATED ORAL at 21:17

## 2023-01-01 RX ADMIN — PANTOPRAZOLE SODIUM 40 MG: 40 TABLET, DELAYED RELEASE ORAL at 06:28

## 2023-01-01 RX ADMIN — SODIUM CHLORIDE, POTASSIUM CHLORIDE, SODIUM LACTATE AND CALCIUM CHLORIDE 75 ML/HR: 600; 310; 30; 20 INJECTION, SOLUTION INTRAVENOUS at 21:04

## 2023-01-01 RX ADMIN — POTASSIUM CHLORIDE 40 MEQ: 1500 TABLET, EXTENDED RELEASE ORAL at 09:56

## 2023-01-01 RX ADMIN — FERROUS SULFATE TAB 325 MG (65 MG ELEMENTAL FE) 325 MG: 325 (65 FE) TAB at 09:56

## 2023-01-01 RX ADMIN — PANTOPRAZOLE SODIUM 40 MG: 40 TABLET, DELAYED RELEASE ORAL at 15:03

## 2023-01-01 RX ADMIN — MIRTAZAPINE 15 MG: 15 TABLET, FILM COATED ORAL at 21:00

## 2023-01-01 RX ADMIN — IPRATROPIUM BROMIDE AND ALBUTEROL SULFATE 3 ML: 2.5; .5 SOLUTION RESPIRATORY (INHALATION) at 14:05

## 2023-01-01 RX ADMIN — VANCOMYCIN HYDROCHLORIDE 1000 MG: 1 INJECTION, SOLUTION INTRAVENOUS at 15:00

## 2023-01-01 RX ADMIN — FERROUS SULFATE TAB 325 MG (65 MG ELEMENTAL FE) 325 MG: 325 (65 FE) TAB at 12:41

## 2023-01-01 RX ADMIN — AZITHROMYCIN MONOHYDRATE 500 MG: 500 INJECTION, POWDER, LYOPHILIZED, FOR SOLUTION INTRAVENOUS at 23:44

## 2023-01-01 RX ADMIN — SODIUM CHLORIDE 100 ML/HR: 9 INJECTION, SOLUTION INTRAVENOUS at 13:48

## 2023-01-01 RX ADMIN — SODIUM CHLORIDE, POTASSIUM CHLORIDE, SODIUM LACTATE AND CALCIUM CHLORIDE 1000 ML: 600; 310; 30; 20 INJECTION, SOLUTION INTRAVENOUS at 13:32

## 2023-01-01 SDOH — ECONOMIC STABILITY: INCOME INSECURITY: IN THE PAST 12 MONTHS, HAS THE ELECTRIC, GAS, OIL, OR WATER COMPANY THREATENED TO SHUT OFF SERVICE IN YOUR HOME?: NO

## 2023-01-01 SDOH — ECONOMIC STABILITY: TRANSPORTATION INSECURITY
IN THE PAST 12 MONTHS, HAS LACK OF TRANSPORTATION KEPT YOU FROM MEETINGS, WORK, OR FROM GETTING THINGS NEEDED FOR DAILY LIVING?: NO

## 2023-01-01 SDOH — ECONOMIC STABILITY: FOOD INSECURITY: WITHIN THE PAST 12 MONTHS, YOU WORRIED THAT YOUR FOOD WOULD RUN OUT BEFORE YOU GOT MONEY TO BUY MORE.: NEVER TRUE

## 2023-01-01 SDOH — ECONOMIC STABILITY: FOOD INSECURITY: WITHIN THE PAST 12 MONTHS, THE FOOD YOU BOUGHT JUST DIDN'T LAST AND YOU DIDN'T HAVE MONEY TO GET MORE.: NEVER TRUE

## 2023-01-01 SDOH — ECONOMIC STABILITY: INCOME INSECURITY: IN THE LAST 12 MONTHS, WAS THERE A TIME WHEN YOU WERE NOT ABLE TO PAY THE MORTGAGE OR RENT ON TIME?: NO

## 2023-01-01 SDOH — ECONOMIC STABILITY: INCOME INSECURITY: HOW HARD IS IT FOR YOU TO PAY FOR THE VERY BASICS LIKE FOOD, HOUSING, MEDICAL CARE, AND HEATING?: NOT VERY HARD

## 2023-01-01 SDOH — SOCIAL STABILITY: SOCIAL INSECURITY: ARE YOU OR HAVE YOU BEEN THREATENED OR ABUSED PHYSICALLY, EMOTIONALLY, OR SEXUALLY BY ANYONE?: NO

## 2023-01-01 SDOH — ECONOMIC STABILITY: TRANSPORTATION INSECURITY
IN THE PAST 12 MONTHS, HAS THE LACK OF TRANSPORTATION KEPT YOU FROM MEDICAL APPOINTMENTS OR FROM GETTING MEDICATIONS?: NO

## 2023-01-01 SDOH — SOCIAL STABILITY: SOCIAL INSECURITY: DO YOU FEEL UNSAFE GOING BACK TO THE PLACE WHERE YOU ARE LIVING?: NO

## 2023-01-01 SDOH — ECONOMIC STABILITY: HOUSING INSECURITY
IN THE LAST 12 MONTHS, WAS THERE A TIME WHEN YOU DID NOT HAVE A STEADY PLACE TO SLEEP OR SLEPT IN A SHELTER (INCLUDING NOW)?: NO

## 2023-01-01 SDOH — ECONOMIC STABILITY: INCOME INSECURITY: HOW HARD IS IT FOR YOU TO PAY FOR THE VERY BASICS LIKE FOOD, HOUSING, MEDICAL CARE, AND HEATING?: NOT HARD AT ALL

## 2023-01-01 SDOH — ECONOMIC STABILITY: HOUSING INSECURITY: IN THE LAST 12 MONTHS, HOW MANY PLACES HAVE YOU LIVED?: 1

## 2023-01-01 SDOH — SOCIAL STABILITY: SOCIAL INSECURITY: ABUSE: ADULT

## 2023-01-01 SDOH — SOCIAL STABILITY: SOCIAL INSECURITY: WERE YOU ABLE TO COMPLETE ALL THE BEHAVIORAL HEALTH SCREENINGS?: YES

## 2023-01-01 SDOH — SOCIAL STABILITY: SOCIAL INSECURITY: HAVE YOU HAD THOUGHTS OF HARMING ANYONE ELSE?: NO

## 2023-01-01 SDOH — SOCIAL STABILITY: SOCIAL INSECURITY: HAS ANYONE EVER THREATENED TO HURT YOUR FAMILY OR YOUR PETS?: NO

## 2023-01-01 SDOH — SOCIAL STABILITY: SOCIAL INSECURITY: ARE THERE ANY APPARENT SIGNS OF INJURIES/BEHAVIORS THAT COULD BE RELATED TO ABUSE/NEGLECT?: NO

## 2023-01-01 SDOH — SOCIAL STABILITY: SOCIAL INSECURITY: DO YOU FEEL ANYONE HAS EXPLOITED OR TAKEN ADVANTAGE OF YOU FINANCIALLY OR OF YOUR PERSONAL PROPERTY?: NO

## 2023-01-01 SDOH — SOCIAL STABILITY: SOCIAL INSECURITY: DOES ANYONE TRY TO KEEP YOU FROM HAVING/CONTACTING OTHER FRIENDS OR DOING THINGS OUTSIDE YOUR HOME?: NO

## 2023-01-01 ASSESSMENT — COGNITIVE AND FUNCTIONAL STATUS - GENERAL
TOILETING: A LITTLE
PERSONAL GROOMING: A LITTLE
MOVING TO AND FROM BED TO CHAIR: A LOT
HELP NEEDED FOR BATHING: A LITTLE
PERSONAL GROOMING: A LITTLE
DRESSING REGULAR LOWER BODY CLOTHING: A LITTLE
WALKING IN HOSPITAL ROOM: A LITTLE
DRESSING REGULAR UPPER BODY CLOTHING: A LITTLE
MOVING FROM LYING ON BACK TO SITTING ON SIDE OF FLAT BED WITH BEDRAILS: A LITTLE
STANDING UP FROM CHAIR USING ARMS: A LOT
TURNING FROM BACK TO SIDE WHILE IN FLAT BAD: A LITTLE
CLIMB 3 TO 5 STEPS WITH RAILING: A LOT
HELP NEEDED FOR BATHING: A LOT
MOVING TO AND FROM BED TO CHAIR: A LITTLE
MOBILITY SCORE: 17
DRESSING REGULAR LOWER BODY CLOTHING: A LOT
MOBILITY SCORE: 17
MOBILITY SCORE: 17
PERSONAL GROOMING: A LOT
STANDING UP FROM CHAIR USING ARMS: A LOT
MOVING TO AND FROM BED TO CHAIR: A LITTLE
HELP NEEDED FOR BATHING: A LOT
CLIMB 3 TO 5 STEPS WITH RAILING: A LOT
WALKING IN HOSPITAL ROOM: A LOT
STANDING UP FROM CHAIR USING ARMS: A LOT
DAILY ACTIVITIY SCORE: 22
DRESSING REGULAR LOWER BODY CLOTHING: A LOT
TOILETING: A LITTLE
TURNING FROM BACK TO SIDE WHILE IN FLAT BAD: A LITTLE
PATIENT BASELINE BEDBOUND: NO
WALKING IN HOSPITAL ROOM: A LITTLE
EATING MEALS: A LITTLE
MOBILITY SCORE: 13
STANDING UP FROM CHAIR USING ARMS: A LITTLE
WALKING IN HOSPITAL ROOM: A LITTLE
DRESSING REGULAR UPPER BODY CLOTHING: A LOT
MOVING FROM LYING ON BACK TO SITTING ON SIDE OF FLAT BED WITH BEDRAILS: A LITTLE
DRESSING REGULAR UPPER BODY CLOTHING: A LOT
EATING MEALS: A LITTLE
CLIMB 3 TO 5 STEPS WITH RAILING: A LOT
WALKING IN HOSPITAL ROOM: A LITTLE
TURNING FROM BACK TO SIDE WHILE IN FLAT BAD: A LITTLE
DRESSING REGULAR LOWER BODY CLOTHING: A LITTLE
STANDING UP FROM CHAIR USING ARMS: A LITTLE
EATING MEALS: A LITTLE
MOVING TO AND FROM BED TO CHAIR: A LOT
PERSONAL GROOMING: A LITTLE
TURNING FROM BACK TO SIDE WHILE IN FLAT BAD: A LITTLE
MOVING TO AND FROM BED TO CHAIR: A LITTLE
MOBILITY SCORE: 14
HELP NEEDED FOR BATHING: A LITTLE
TURNING FROM BACK TO SIDE WHILE IN FLAT BAD: A LITTLE
TURNING FROM BACK TO SIDE WHILE IN FLAT BAD: A LOT
MOVING FROM LYING ON BACK TO SITTING ON SIDE OF FLAT BED WITH BEDRAILS: A LITTLE
MOVING TO AND FROM BED TO CHAIR: A LOT
MOBILITY SCORE: 15
DRESSING REGULAR LOWER BODY CLOTHING: A LITTLE
CLIMB 3 TO 5 STEPS WITH RAILING: A LOT
WALKING IN HOSPITAL ROOM: A LITTLE
TURNING FROM BACK TO SIDE WHILE IN FLAT BAD: A LITTLE
CLIMB 3 TO 5 STEPS WITH RAILING: A LOT
TOILETING: A LOT
HELP NEEDED FOR BATHING: A LITTLE
DAILY ACTIVITIY SCORE: 18
MOVING FROM LYING ON BACK TO SITTING ON SIDE OF FLAT BED WITH BEDRAILS: A LITTLE
STANDING UP FROM CHAIR USING ARMS: A LITTLE
CLIMB 3 TO 5 STEPS WITH RAILING: A LOT
PATIENT BASELINE BEDBOUND: NO
STANDING UP FROM CHAIR USING ARMS: A LITTLE
CLIMB 3 TO 5 STEPS WITH RAILING: A LOT
MOVING FROM LYING ON BACK TO SITTING ON SIDE OF FLAT BED WITH BEDRAILS: A LITTLE
TOILETING: A LOT
DAILY ACTIVITIY SCORE: 18
DRESSING REGULAR UPPER BODY CLOTHING: A LITTLE
MOVING FROM LYING ON BACK TO SITTING ON SIDE OF FLAT BED WITH BEDRAILS: A LITTLE
MOVING TO AND FROM BED TO CHAIR: A LITTLE
MOVING FROM LYING ON BACK TO SITTING ON SIDE OF FLAT BED WITH BEDRAILS: A LITTLE
WALKING IN HOSPITAL ROOM: A LOT
EATING MEALS: A LITTLE
DAILY ACTIVITIY SCORE: 13
MOBILITY SCORE: 17
DAILY ACTIVITIY SCORE: 14

## 2023-01-01 ASSESSMENT — ACTIVITIES OF DAILY LIVING (ADL)
HEARING - LEFT EAR: FUNCTIONAL
ADEQUATE_TO_COMPLETE_ADL: YES
BATHING: NEEDS ASSISTANCE
HEARING - RIGHT EAR: FUNCTIONAL
TOILETING: NEEDS ASSISTANCE
LACK_OF_TRANSPORTATION: NO
DOING_HOUSEWORK: NEEDS ASSISTANCE
FEEDING YOURSELF: INDEPENDENT
WALKS IN HOME: NEEDS ASSISTANCE
ASSISTIVE_DEVICE: WALKER
TOILETING: NEEDS ASSISTANCE
HEARING - LEFT EAR: FUNCTIONAL
GROOMING: INDEPENDENT
DRESSING YOURSELF: NEEDS ASSISTANCE
GROCERY_SHOPPING: NEEDS ASSISTANCE
BATHING: NEEDS ASSISTANCE
TOILETING: NEEDS ASSISTANCE
PATIENT'S MEMORY ADEQUATE TO SAFELY COMPLETE DAILY ACTIVITIES?: YES
HEARING - RIGHT EAR: FUNCTIONAL
DRESSING YOURSELF: NEEDS ASSISTANCE
JUDGMENT_ADEQUATE_SAFELY_COMPLETE_DAILY_ACTIVITIES: YES
FEEDING YOURSELF: INDEPENDENT
GROOMING: NEEDS ASSISTANCE
WALKS IN HOME: NEEDS ASSISTANCE
ADEQUATE_TO_COMPLETE_ADL: YES
BATHING: INDEPENDENT
WALKS IN HOME: NEEDS ASSISTANCE
HEARING - RIGHT EAR: FUNCTIONAL
HEARING - LEFT EAR: FUNCTIONAL
DRESSING: INDEPENDENT
JUDGMENT_ADEQUATE_SAFELY_COMPLETE_DAILY_ACTIVITIES: YES
ASSISTIVE_DEVICE: WALKER
JUDGMENT_ADEQUATE_SAFELY_COMPLETE_DAILY_ACTIVITIES: YES
DRESSING YOURSELF: NEEDS ASSISTANCE
GROOMING: INDEPENDENT
PATIENT'S MEMORY ADEQUATE TO SAFELY COMPLETE DAILY ACTIVITIES?: YES
ASSISTIVE_DEVICE: WALKER
FEEDING YOURSELF: INDEPENDENT
TAKING_MEDICATION: NEEDS ASSISTANCE
MANAGING_FINANCES: INDEPENDENT
ADEQUATE_TO_COMPLETE_ADL: YES
BATHING_ASSISTANCE: STAND BY
BATHING: NEEDS ASSISTANCE
HOME_MANAGEMENT_TIME_ENTRY: 10
PATIENT'S MEMORY ADEQUATE TO SAFELY COMPLETE DAILY ACTIVITIES?: YES

## 2023-01-01 ASSESSMENT — PATIENT HEALTH QUESTIONNAIRE - PHQ9
2. FEELING DOWN, DEPRESSED OR HOPELESS: SEVERAL DAYS
4. FEELING TIRED OR HAVING LITTLE ENERGY: NOT AT ALL
8. MOVING OR SPEAKING SO SLOWLY THAT OTHER PEOPLE COULD HAVE NOTICED. OR THE OPPOSITE, BEING SO FIGETY OR RESTLESS THAT YOU HAVE BEEN MOVING AROUND A LOT MORE THAN USUAL: NOT AT ALL
1. LITTLE INTEREST OR PLEASURE IN DOING THINGS: NOT AT ALL
6. FEELING BAD ABOUT YOURSELF - OR THAT YOU ARE A FAILURE OR HAVE LET YOURSELF OR YOUR FAMILY DOWN: NOT AT ALL
SUM OF ALL RESPONSES TO PHQ9 QUESTIONS 1 & 2: 1
3. TROUBLE FALLING OR STAYING ASLEEP OR SLEEPING TOO MUCH: NOT AT ALL
2. FEELING DOWN, DEPRESSED OR HOPELESS: NEARLY EVERY DAY
5. POOR APPETITE OR OVEREATING: SEVERAL DAYS
SUM OF ALL RESPONSES TO PHQ9 QUESTIONS 1 AND 2: 6
2. FEELING DOWN, DEPRESSED OR HOPELESS: NOT AT ALL
SUM OF ALL RESPONSES TO PHQ9 QUESTIONS 1 & 2: 0
1. LITTLE INTEREST OR PLEASURE IN DOING THINGS: NOT AT ALL
10. IF YOU CHECKED OFF ANY PROBLEMS, HOW DIFFICULT HAVE THESE PROBLEMS MADE IT FOR YOU TO DO YOUR WORK, TAKE CARE OF THINGS AT HOME, OR GET ALONG WITH OTHER PEOPLE: NOT DIFFICULT AT ALL
SUM OF ALL RESPONSES TO PHQ QUESTIONS 1-9: 7
7. TROUBLE CONCENTRATING ON THINGS, SUCH AS READING THE NEWSPAPER OR WATCHING TELEVISION: NOT AT ALL
1. LITTLE INTEREST OR PLEASURE IN DOING THINGS: NEARLY EVERY DAY
9. THOUGHTS THAT YOU WOULD BE BETTER OFF DEAD, OR OF HURTING YOURSELF: NOT AT ALL

## 2023-01-01 ASSESSMENT — COLUMBIA-SUICIDE SEVERITY RATING SCALE - C-SSRS
1. IN THE PAST MONTH, HAVE YOU WISHED YOU WERE DEAD OR WISHED YOU COULD GO TO SLEEP AND NOT WAKE UP?: NO
1. IN THE PAST MONTH, HAVE YOU WISHED YOU WERE DEAD OR WISHED YOU COULD GO TO SLEEP AND NOT WAKE UP?: NO
6. HAVE YOU EVER DONE ANYTHING, STARTED TO DO ANYTHING, OR PREPARED TO DO ANYTHING TO END YOUR LIFE?: NO
2. HAVE YOU ACTUALLY HAD ANY THOUGHTS OF KILLING YOURSELF?: NO
2. HAVE YOU ACTUALLY HAD ANY THOUGHTS OF KILLING YOURSELF?: NO
6. HAVE YOU EVER DONE ANYTHING, STARTED TO DO ANYTHING, OR PREPARED TO DO ANYTHING TO END YOUR LIFE?: NO

## 2023-01-01 ASSESSMENT — PAIN SCALES - GENERAL
PAINLEVEL_OUTOF10: 0 - NO PAIN
PAINLEVEL_OUTOF10: 3
PAINLEVEL_OUTOF10: 0 - NO PAIN
PAINLEVEL_OUTOF10: 3
PAINLEVEL_OUTOF10: 0 - NO PAIN
PAINLEVEL_OUTOF10: 7

## 2023-01-01 ASSESSMENT — ENCOUNTER SYMPTOMS
CONSTITUTIONAL NEGATIVE: 1
GASTROINTESTINAL NEGATIVE: 1
RESPIRATORY NEGATIVE: 1
LOSS OF SENSATION IN FEET: 0
ARTHRALGIAS: 1
CARDIOVASCULAR NEGATIVE: 1
PSYCHIATRIC NEGATIVE: 1
DEPRESSION: 1
OCCASIONAL FEELINGS OF UNSTEADINESS: 1
BACK PAIN: 1

## 2023-01-01 ASSESSMENT — PAIN DESCRIPTION - LOCATION: LOCATION: KNEE

## 2023-01-01 ASSESSMENT — LIFESTYLE VARIABLES
AUDIT-C TOTAL SCORE: 0
AUDIT-C TOTAL SCORE: 0
PRESCIPTION_ABUSE_PAST_12_MONTHS: NO
HOW OFTEN DO YOU HAVE A DRINK CONTAINING ALCOHOL: NEVER
EVER HAD A DRINK FIRST THING IN THE MORNING TO STEADY YOUR NERVES TO GET RID OF A HANGOVER: NO
SKIP TO QUESTIONS 9-10: 1
HOW MANY STANDARD DRINKS CONTAINING ALCOHOL DO YOU HAVE ON A TYPICAL DAY: PATIENT DOES NOT DRINK
EVER FELT BAD OR GUILTY ABOUT YOUR DRINKING: NO
SUBSTANCE_ABUSE_PAST_12_MONTHS: NO
EVER HAD A DRINK FIRST THING IN THE MORNING TO STEADY YOUR NERVES TO GET RID OF A HANGOVER: NO
HOW OFTEN DO YOU HAVE A DRINK CONTAINING ALCOHOL: NEVER
HAVE YOU EVER FELT YOU SHOULD CUT DOWN ON YOUR DRINKING: NO
EVER FELT BAD OR GUILTY ABOUT YOUR DRINKING: NO
HAVE YOU EVER FELT YOU SHOULD CUT DOWN ON YOUR DRINKING: NO
HAVE PEOPLE ANNOYED YOU BY CRITICIZING YOUR DRINKING: NO
HOW MANY STANDARD DRINKS CONTAINING ALCOHOL DO YOU HAVE ON A TYPICAL DAY: PATIENT DOES NOT DRINK
AUDIT-C TOTAL SCORE: 0
AUDIT-C TOTAL SCORE: 0
HAVE PEOPLE ANNOYED YOU BY CRITICIZING YOUR DRINKING: NO
HOW OFTEN DO YOU HAVE 6 OR MORE DRINKS ON ONE OCCASION: NEVER
SKIP TO QUESTIONS 9-10: 1
REASON UNABLE TO ASSESS: NO
HOW OFTEN DO YOU HAVE 6 OR MORE DRINKS ON ONE OCCASION: NEVER
REASON UNABLE TO ASSESS: NO

## 2023-01-01 ASSESSMENT — PAIN DESCRIPTION - ORIENTATION: ORIENTATION: RIGHT

## 2023-01-01 ASSESSMENT — PAIN - FUNCTIONAL ASSESSMENT
PAIN_FUNCTIONAL_ASSESSMENT: 0-10

## 2023-06-22 PROBLEM — I87.2 VENOUS INSUFFICIENCY: Status: ACTIVE | Noted: 2023-01-01

## 2023-06-22 PROBLEM — I89.0 LYMPHEDEMA OF RIGHT LOWER EXTREMITY: Status: ACTIVE | Noted: 2023-01-01

## 2023-06-22 PROBLEM — D64.9 ANEMIA: Status: ACTIVE | Noted: 2023-01-01

## 2023-06-22 PROBLEM — I10 BENIGN ESSENTIAL HYPERTENSION: Status: ACTIVE | Noted: 2023-01-01

## 2023-06-22 PROBLEM — I25.10 CAD S/P PERCUTANEOUS CORONARY ANGIOPLASTY: Status: ACTIVE | Noted: 2023-01-01

## 2023-06-22 PROBLEM — N39.0 RECURRENT UTI: Status: ACTIVE | Noted: 2023-01-01

## 2023-06-22 PROBLEM — R60.0 BILATERAL LEG EDEMA: Status: ACTIVE | Noted: 2023-01-01

## 2023-06-22 PROBLEM — E04.1 THYROID NODULE: Status: ACTIVE | Noted: 2023-01-01

## 2023-06-22 PROBLEM — E87.6 HYPOKALEMIA: Status: ACTIVE | Noted: 2023-01-01

## 2023-06-22 PROBLEM — K44.9 HIATAL HERNIA: Status: ACTIVE | Noted: 2023-01-01

## 2023-06-22 PROBLEM — F32.A DEPRESSION: Status: ACTIVE | Noted: 2023-01-01

## 2023-06-22 PROBLEM — L03.115 CELLULITIS OF RIGHT LOWER EXTREMITY: Status: ACTIVE | Noted: 2023-01-01

## 2023-06-22 PROBLEM — J44.9 COPD (CHRONIC OBSTRUCTIVE PULMONARY DISEASE) (MULTI): Status: ACTIVE | Noted: 2023-01-01

## 2023-06-22 PROBLEM — E78.00 HYPERCHOLESTEREMIA: Status: ACTIVE | Noted: 2023-01-01

## 2023-06-22 PROBLEM — F41.9 ANXIETY DISORDER: Status: ACTIVE | Noted: 2023-01-01

## 2023-06-22 PROBLEM — I25.2 OLD ANTEROSEPTAL MYOCARDIAL INFARCTION: Status: ACTIVE | Noted: 2023-01-01

## 2023-06-22 PROBLEM — Z98.61 CAD S/P PERCUTANEOUS CORONARY ANGIOPLASTY: Status: ACTIVE | Noted: 2023-01-01

## 2023-06-22 PROBLEM — K92.2 GI BLEED: Status: ACTIVE | Noted: 2023-01-01

## 2023-06-22 NOTE — PROGRESS NOTES
Subjective   Shaina Roberts is a 89 y.o. female who presents for Leg Swelling.  Pt has new erythema of R shin at least since yesterday when daughter first noticed it. She has chronic b/l LE swelling that is thought to be from chronic venous insufficiency and she does not wear her compression stockings. Her R leg is more swollen than the L leg and it has been this way for at least 2 years. The last time she presented for the R leg being more swollen, a duplex US was done and it showed no DVT. The erythematous area on her shin is not itchy or painful and this is no open wound. She does not remember injuring it. She has some b/l ankle pain that is chronic but no erythema or swelling of those. She has broken her R hip before and had this surgically repaired (about 5 years ago) and she also has chronic R knee pain and gets knee injections. She lives at home alone and is very independent. She ambulates with a walker due to a hx of falls from unsteady gait. Denies any current fevers, chills, SOB, CP, recent long distance travel, recent surgeries, hormone use, or smoking. No hx of blood clots.     Objective   /80 (BP Location: Left arm, Patient Position: Sitting, BP Cuff Size: Adult)   Pulse 68   Temp 36.8 °C (98.2 °F)   Resp 17   Wt 68 kg (150 lb)   SpO2 99%   BMI 30.04 kg/m²    PHYSICAL EXAM  Gen: Well appearing, in NAD  Eyes: EOMI  Heart: RRR, no murmurs  Lungs: No increased work of breathing, CTAB, on RA  GI: Soft, NTND, no guarding or rebound  Extremities: WWP, cap refill <2sec, 2+ pitting edema of LLE, 4+ pitting edema of RLE with erythema covering most of the R shin. No warmth, drainage, skin abrasion. Pulses intact.  Neuro: Alert, symmetrical facies, moves all extremities equally  Psych: Appropriate mood and affect    Assessment/Plan     Problem List Items Addressed This Visit          Circulatory    Venous insufficiency       Musculoskeletal    Bilateral leg edema     Instructed to start wearing  compression stockings during day time hours when legs are not elevated (she sleeps in bed at night with legs elevated). She has a hard time putting these on herself so recommended that she either buy a contraption from the drug store that helps her put the stockings on, or to have a family member stop by every day or every other day to help her with them (family is in agreement). As far as her RLE swelling being worse than LLE, this has been ongoing for 2+ years and US has already ruled out DVT. It could be related to the fact that she has hip and knee problems specifically on her R side that could have affected lymphatic flow.          Relevant Orders    Compression Stockings 20-30 mmHg    Cellulitis of right lower extremity - Primary     No hx of MRSA and no risk factors for MRSA so will treat her cellulitis with keflex. ED precautions given.         Relevant Medications    cephalexin (Keflex) 500 mg capsule      Esme Ramirez DO  Family Medicine Resident, PGY-2  [facility]  [address] [place] [address] [phone]

## 2023-06-22 NOTE — ASSESSMENT & PLAN NOTE
No hx of MRSA and no risk factors for MRSA so will treat her cellulitis with keflex. ED precautions given.

## 2023-06-22 NOTE — ASSESSMENT & PLAN NOTE
Instructed to start wearing compression stockings during day time hours when legs are not elevated (she sleeps in bed at night with legs elevated). She has a hard time putting these on herself so recommended that she either buy a contraption from the drug store that helps her put the stockings on, or to have a family member stop by every day or every other day to help her with them (family is in agreement). As far as her RLE swelling being worse than LLE, this has been ongoing for 2+ years and US has already ruled out DVT. It could be related to the fact that she has hip and knee problems specifically on her R side that could have affected lymphatic flow.

## 2023-06-22 NOTE — ASSESSMENT & PLAN NOTE
Instructed to start wearing compression stockings during day time hours when legs are not elevated (she sleeps in bed at night with legs elevated). She has a hard time putting these on herself so recommended that she either buy a contraption from the drug store that helps her put the stockings on, or to have a family member stop by every day or every other day to help her with them (family is in agreement). As far as her RLE swelling being worse than LLE, this has been ongoing for 2+ years and US has already ruled out DVT.

## 2023-06-26 NOTE — PROGRESS NOTES
I saw and evaluated the patient. I personally obtained the key and critical portions of the history and physical exam or was physically present for key and critical portions performed by the resident/fellow. I reviewed the resident/fellow's documentation and discussed the patient with the resident/fellow. I agree with the resident/fellow's medical decision making as documented in the note.    Marsha Aponte MD

## 2023-09-26 NOTE — PROGRESS NOTES
"Subjective   Reason for Visit: Shaina Roberts is an 89 y.o. female here for a Medicare Wellness visit.          Reviewed all medications by prescribing practitioner or clinical pharmacist (such as prescriptions, OTCs, herbal therapies and supplements) and documented in the medical record.    Patient is feeling nok.  No chest pain or shortness of breath.  Still has troubles with meals due to her hiatal hernia.  She is not getting around as well.  She has had 3 falls.  She does not want to start physical therapy.  She needs a walker.  Patient's legs have been swollen.  She did have a case of cellulitis that appeared to resolve.  Patient's son and daughter are helping care for her.  She has stopped driving.  Still has knee pain patient does not want any screening test        Patient Care Team:  Deep Henao DO as PCP - General  Deep Henao DO as PCP - [employer] PCP     Review of Systems   Constitutional: Negative.    HENT: Negative.     Respiratory: Negative.     Cardiovascular: Negative.    Gastrointestinal: Negative.    Genitourinary: Negative.    Musculoskeletal:  Positive for arthralgias and back pain.   Psychiatric/Behavioral: Negative.         Objective   Vitals:  /84 (BP Location: Right arm, Patient Position: Sitting, BP Cuff Size: Adult)   Pulse 68   Temp 36.3 °C (97.3 °F)   Resp 17   Ht 1.575 m (5' 2\")   Wt 69.9 kg (154 lb)   SpO2 96%   BMI 28.17 kg/m²       Physical Exam  Constitutional:       Appearance: Normal appearance.   HENT:      Right Ear: Tympanic membrane normal.      Left Ear: Tympanic membrane normal.      Nose: Nose normal.      Mouth/Throat:      Mouth: Mucous membranes are moist.   Cardiovascular:      Rate and Rhythm: Normal rate and regular rhythm.   Pulmonary:      Effort: Pulmonary effort is normal.      Breath sounds: Normal breath sounds.   Abdominal:      General: Abdomen is flat.   Musculoskeletal:      Comments: Patient has trouble ambulating.  She " does have bilateral leg swelling.  This is a chronic   Neurological:      General: No focal deficit present.      Mental Status: She is alert.         Assessment/Plan   Problem List Items Addressed This Visit       Bilateral leg edema    Relevant Orders    CBC    Walker with Seat     Other Visit Diagnoses       Routine general medical examination at health care facility    -  Primary    Relevant Orders    CBC    Comprehensive Metabolic Panel    Lipid Panel    Healthcare maintenance        Relevant Orders    CBC        Patient is declining any screening test.  She is willing to do blood test.  She needs a walker.  She does not want to physical therapy for falls.  We will send a walker to the pharmacy  Patient is can follow-up in 1 to 2 months.  Both patient and his sons were above risk of falling.  If any chest pain shortness of breath any nausea vomiting any worsening symptoms notify the office to go to the ER

## 2023-09-27 NOTE — RESULT ENCOUNTER NOTE
Can we let patient know her glucose kidney and liver function all look fine her total cholesterol is 126 which is good.  Her blood count is good by Miriam for shoulder pain

## 2023-10-23 PROBLEM — J18.9 COMMUNITY ACQUIRED PNEUMONIA: Status: ACTIVE | Noted: 2023-01-01

## 2023-10-23 PROBLEM — J96.11 CHRONIC RESPIRATORY FAILURE WITH HYPOXIA (MULTI): Status: ACTIVE | Noted: 2023-01-01

## 2023-10-23 PROBLEM — R79.89 ELEVATED TROPONIN: Status: ACTIVE | Noted: 2023-01-01

## 2023-10-23 PROBLEM — I20.9 ANGINA PECTORIS (CMS-HCC): Status: ACTIVE | Noted: 2023-01-01

## 2023-10-23 PROBLEM — K92.2 GI BLEED: Status: RESOLVED | Noted: 2023-01-01 | Resolved: 2023-01-01

## 2023-10-23 NOTE — PROGRESS NOTES
Subjective   Patient ID: Shaina Roberts is a 89 y.o. female who presents for Fatigue (They went on a trip to [place] and over the weekend she was a little out of sorts. Son -Lul Roberts states that he thinks she was dehydrated./She has a slight cough that started today./He would like a lift chair and bed bar.).    Patient here today with chief complaint of weakness that has been ongoing over the past several days.  Patient was recently on a road trip visiting son in [place], is here today accompanied by her adult son who says that she was having difficulties in rising from her wheelchair.  Patient does have an extensive past medical history significant for osteoarthritis particularly in the right leg.  Apart from global weakness patient is complaining of right leg pain that appears to be worsening.  Denies any recent trauma or falls.  Denies any focal neurological deficits, no numbness or tingling, no slurring of speech.  Patient's son states that on the day of return from their trip she was having some confusion and potential hallucinations as well.  Denies any dysuria or hematuria or urinary frequency.  Patient does have decreased appetite and decreased p.o. fluid intake and since trip son states that he has been trying to encourage more fluids recently.  Patient states that she just does not have the desire to drink water and notes that lifelong she has had poor appetite.         Review of Systems    Objective   /82 (BP Location: Right arm, Patient Position: Sitting)   Pulse 62   Temp 36.1 °C (97 °F)   Resp 16   SpO2 97%     Physical Exam  HENT:      Nose: No congestion or rhinorrhea.      Mouth/Throat:      Mouth: Mucous membranes are dry.   Eyes:      General: No scleral icterus.     Extraocular Movements: Extraocular movements intact.   Cardiovascular:      Rate and Rhythm: Normal rate. Rhythm irregular.      Pulses: Normal pulses.      Heart sounds: Normal heart sounds. No murmur  heard.  Pulmonary:      Effort: Pulmonary effort is normal.      Breath sounds: Normal breath sounds. No wheezing or rales.   Abdominal:      Palpations: Abdomen is soft.      Tenderness: There is no abdominal tenderness. There is no guarding.   Musculoskeletal:      Comments: Painful joint line palpation of the right lower extremity, calf tenderness on right.  Range of motion severely limited secondary to pain of the right lower extremity.  No discernible gross deformity however physical exam was limited secondary to significant pain   Skin:     Comments: Area of redness around the right lower extremity, does have dry skin overlying the area however no discernible skin breaks or significant breakdown.  Skin is warm and tender.   Neurological:      General: No focal deficit present.      Mental Status: She is oriented to person, place, and time.      Motor: Weakness present.      Gait: Gait normal.      Comments: Bilateral weakness of the lower extremities, patient unable to stand on own power.  Normally ambulates with walker however is utilizing wheelchair at this time.  Strength testing appears symmetric no focal deficits notable, cranial nerves II through XII appear grossly intact.     Psychiatric:         Mood and Affect: Mood normal.         Assessment/Plan   Problem List Items Addressed This Visit             ICD-10-CM    COPD (chronic obstructive pulmonary disease) (CMS/Formerly [facility]) J44.9    Depression, recurrent (CMS/[facility]) F33.9    Cellulitis of right lower extremity L03.115    Chronic respiratory failure with hypoxia (CMS/Formerly [facility]) J96.11    Angina pectoris (CMS/Formerly [facility]) I20.9     Other Visit Diagnoses         Codes    Weakness    -  Primary R53.1    Relevant Orders    CBC and Auto Differential    Comprehensive Metabolic Panel    Urinalysis with Reflex Microscopic    Urine Culture    Seat lift mechanism incorporated into a combination lift-chair mechanism    ECG 12 lead (Clinic Performed) (Completed)        At this time  patient having crisis of ambulation secondary to pain and weakness.  Will refer patient to the emergency department at this time.  Completed EKG given irregularity notable on physical exam however EKG failed to demonstrate any ischemic findings.  Appeared normal rhythm however P wave morphology difficult to discern secondary to significant artifact given patient is wheelchair-bound currently.  Had ordered multitude of blood test along with urinalysis to assess for potential metabolic causes to patient's sudden onset weakness.  Low suspicion for acute stroke or TIA at this time given no focal findings.  Etiology for patient's weakness includes dehydration versus potential trauma to underlying joint disease of the right lower extremity causing her to guard when standing.  Given patient's recent travel there is potential for thrombus within the right lower extremity versus return of patient's previous cellulitis.  Given the significant weakness and patient is endorsing of dehydration will recommend patient be evaluated at the emergency room for potential IV antibiotics and further work-up for acute weakness.  Mucous membranes are dry however patient is nontachycardic not hypotensive.  Offered to patient potential for transfer via EMS however declined and stated that they would go to emergency department immediately.  Recommend follow-up after ED/hospital discharge.

## 2023-10-23 NOTE — PROGRESS NOTES
I saw and evaluated the patient. I personally obtained the key and critical portions of the history and physical exam or was physically present for key and critical portions performed by the resident/fellow. I reviewed the resident/fellow's documentation and discussed the patient with the resident/fellow. I agree with the resident/fellow's medical decision making as documented in the note.  Patient 9-hour trip.  She did not urinate.  She was not drinking fluids.  Patient was active there.  However on the way home she started hallucinating, she did not know she was home.  Patient has a right lateral right knee pain.  She felt like it popped on her.  Patient states she has been feeling better since she is been really drinking a lot of fluids.  However she still feels weak, cannot get up due to her knee pain.  On exam her knee actually was popped a few times.  It seems more in an angle than usual.  Patient does appear weak and ill.  Due to the patient's medical history and multiple symptoms she was advised to go to ER for fluids.  Blood work and U/A.  She may need an x-ray and/or ultrasound of her leg.  Patient and son aware of this and are heading the ER now  Follow-up in 1 week  Agree with assessment and plan    Patient doing well with her breathing.  Patient's leg is red.  Make sure she does not have cellulitis.  Patient has no chest pain or shortness of breath.  Her COPD appears to be doing well    Deep Heano, DO

## 2023-10-23 NOTE — ED PROVIDER NOTES
"HPI   Chief Complaint   Patient presents with    Knee Pain     Pt states her PCP sent her for knee pain and \"IV fluids\"       Patient is a 89-year-old female with history of hypertension and COPD although who has never spoke to his sent here by her PCP.  She is accompanied by her son who provides some of the history, he states that they traveled to [place] from Monday through Thursday, they drove the whole 9 hours and did not stop for bathroom breaks, patient did not drink a lot of water as a result and her doctor thinks she might be a little bit dehydrated.  Her son states that they drove back on Thursday and for a couple days after that patient was acting somewhat confused, thought that they were still in [place] however she is improving.  Currently she is alert and oriented x3, she denies any acute complaints, states she feels her normal degree of shortness of breath from COPD but denies any chest pain, denies any abdominal pain, vomiting, diarrhea, burning frequency urgency with urination.  She does have some left leg pain, she has chronic right knee osteoarthritis however her pain is more in the calf, she never had blood clots before, not on blood thinners.  Her son does state that they drove the whole 9 hours without stopping.                          No data recorded                Patient History   Past Medical History:   Diagnosis Date    Essential (primary) hypertension     Benign essential HTN    Other conditions influencing health status     History of renal stent     Past Surgical History:   Procedure Laterality Date    CHOLECYSTECTOMY  08/31/2018    Cholecystectomy    CORONARY ANGIOPLASTY WITH STENT PLACEMENT  08/31/2018    Cath Placement Of Stent 1    OTHER SURGICAL HISTORY  08/31/2018    Treatment Of Hip Fracture     No family history on file.  Social History     Tobacco Use    Smoking status: Never    Smokeless tobacco: Never   Vaping Use    Vaping Use: Never used   Substance Use " Topics    Alcohol use: Not on file    Drug use: Not on file       Physical Exam   ED Triage Vitals [10/23/23 1246]   Temp Heart Rate Resp BP   36 °C (96.8 °F) 67 18 138/73      SpO2 Temp src Heart Rate Source Patient Position   94 % -- -- --      BP Location FiO2 (%)     -- --       Physical Exam  Vitals and nursing note reviewed.   Constitutional:       General: She is not in acute distress.     Appearance: Normal appearance. She is not toxic-appearing.   HENT:      Head: Normocephalic and atraumatic.      Comments:   Dry mucous membranes     Nose: Nose normal.      Mouth/Throat:      Mouth: Mucous membranes are dry.      Pharynx: No oropharyngeal exudate or posterior oropharyngeal erythema.   Eyes:      Extraocular Movements: Extraocular movements intact.   Cardiovascular:      Rate and Rhythm: Normal rate and regular rhythm.   Pulmonary:      Effort: Pulmonary effort is normal.   Abdominal:      Palpations: Abdomen is soft.   Musculoskeletal:         General: Normal range of motion.      Cervical back: Normal range of motion and neck supple.      Comments:   Right calf tenderness is present, positive Homans' sign, no right knee tenderness, no effusions warmth or redness of the right knee or lower extremity.     Skin:     General: Skin is warm and dry.   Neurological:      General: No focal deficit present.      Mental Status: She is alert and oriented to person, place, and time.      GCS: GCS eye subscore is 4. GCS verbal subscore is 5. GCS motor subscore is 6.      Cranial Nerves: Cranial nerves 2-12 are intact. No cranial nerve deficit, dysarthria or facial asymmetry.      Sensory: Sensation is intact. No sensory deficit.      Motor: Motor function is intact. No weakness.   Psychiatric:         Mood and Affect: Mood normal.         Thought Content: Thought content normal.       CT angio chest for pulmonary embolism   Final Result   1. No pulmonary emboli.   2. There are infiltrates in both upper and lower lobes  which may   represent pneumonia.   3. Large midline diaphragmatic hernia.   Signed by Clifford Hernandez MD      Lower extremity venous duplex right   Final Result      CT head wo IV contrast   Final Result   No evidence of acute cortical infarct or intracranial hemorrhage.        MACRO:   None             Signed by: Quentin Lamb 10/23/2023 2:17 PM   Dictation workstation:   [id number]      XR knee right 4+ views   Final Result   1. No acute fracture or dislocation.   2. Degenerative changes, as described above.        MACRO:   None.        Signed by: Quentin Lamb 10/23/2023 2:16 PM   Dictation workstation:   [id number]      XR chest 2 views   Final Result   Left perihilar infiltrate with pleural effusion.   Hiatal hernia.   Signed by Raheel Davenport MD        Labs Reviewed   COMPREHENSIVE METABOLIC PANEL - Abnormal       Result Value    Glucose 102 (*)     Sodium 140      Potassium 3.5      Chloride 106      Bicarbonate 24      Anion Gap 14      Urea Nitrogen 26 (*)     Creatinine 0.82      eGFR 68      Calcium 8.6      Albumin 3.0 (*)     Alkaline Phosphatase 54      Total Protein 5.9 (*)     AST 12      Bilirubin, Total 1.2      ALT 11     TROPONIN I, HIGH SENSITIVITY - Abnormal    Troponin I, High Sensitivity 120 (*)     Narrative:     Less than 99th percentile of normal range cutoff-  Female and children under 18 years old <14 ng/L; Male <21 ng/L: Negative  Repeat testing should be performed if clinically indicated.     Female and children under 18 years old 14-50 ng/L; Male 21-50 ng/L:  Consistent with possible cardiac damage and possible increased clinical   risk. Serial measurements may help to assess extent of myocardial damage.     >50 ng/L: Consistent with cardiac damage, increased clinical risk and  myocardial infarction. Serial measurements may help assess extent of   myocardial damage.      NOTE: Children less than 1 year old may have higher baseline troponin   levels and results should be interpreted in  conjunction with the overall   clinical context.     NOTE: Troponin I testing is performed using a different   testing methodology at [facility] than at other   [facility] hospitals. Direct result comparisons should only   be made within the same method.   CBC WITH AUTO DIFFERENTIAL - Abnormal    WBC 7.7      nRBC 0.0      RBC 4.07      Hemoglobin 12.7      Hematocrit 38.7      MCV 95      MCH 31.2      MCHC 32.8      RDW 15.1 (*)     Platelets 246      MPV 11.3      Immature Granulocytes %, Automated 0.7      Immature Granulocytes Absolute, Automated 0.05      Narrative:     The previously reported component Neutrophils % is no longer being reported.  The previously reported component Lymphocytes % is no longer being reported.  The previously reported component Monocytes % is no longer being reported.  The previously   reported component Eosinophils % is no longer being reported.  The previously reported component Basophils % is no longer being reported.  The previously reported component Absolute Neutrophils is no longer being reported.  The previously reported   component Absolute Lymphocytes is no longer being reported.  The previously reported component Absolute Monocytes is no longer being reported.  The previously reported component Absolute Eosinophils is no longer being reported.  The previously reported   component Absolute Basophils is no longer being reported.   URINALYSIS WITH REFLEX MICROSCOPIC AND CULTURE - Abnormal    Color, Urine Isabel (*)     Appearance, Urine Hazy (*)     Specific Gravity, Urine 1.023      pH, Urine 5.0      Protein, Urine 30 (1+) (*)     Glucose, Urine NEGATIVE      Blood, Urine SMALL (1+) (*)     Ketones, Urine NEGATIVE      Bilirubin, Urine NEGATIVE      Urobilinogen, Urine <2.0      Nitrite, Urine NEGATIVE      Leukocyte Esterase, Urine SMALL (1+) (*)    TROPONIN I, HIGH SENSITIVITY - Abnormal    Troponin I, High Sensitivity 125 (*)     Narrative:     Less than 99th  percentile of normal range cutoff-  Female and children under 18 years old <14 ng/L; Male <21 ng/L: Negative  Repeat testing should be performed if clinically indicated.     Female and children under 18 years old 14-50 ng/L; Male 21-50 ng/L:  Consistent with possible cardiac damage and possible increased clinical   risk. Serial measurements may help to assess extent of myocardial damage.     >50 ng/L: Consistent with cardiac damage, increased clinical risk and  myocardial infarction. Serial measurements may help assess extent of   myocardial damage.      NOTE: Children less than 1 year old may have higher baseline troponin   levels and results should be interpreted in conjunction with the overall   clinical context.     NOTE: Troponin I testing is performed using a different   testing methodology at [facility] than at other   [facility]. Direct result comparisons should only   be made within the same method.   B-TYPE NATRIURETIC PEPTIDE - Abnormal     (*)     Narrative:        <100 pg/mL - Heart failure unlikely  100-299 pg/mL - Intermediate probability of acute heart                  failure exacerbation. Correlate with clinical                  context and patient history.    >=300 pg/mL - Heart Failure likely. Correlate with clinical                  context and patient history.    BNP testing is performed using different testing methodology at [facility] than at other [facility]. Direct result comparisons should only be made within the same method.      MICROSCOPIC ONLY, URINE - Abnormal    WBC, Urine 6-10 (*)     RBC, Urine NONE      Squamous Epithelial Cells, Urine 1-9 (SPARSE)      Bacteria, Urine 4+ (*)    MANUAL DIFFERENTIAL - Abnormal    Neutrophils %, Manual 80.0      Bands %, Manual 1.0      Lymphocytes %, Manual 13.0      Monocytes %, Manual 4.0      Eosinophils %, Manual 2.0      Basophils %, Manual 0.0      Seg Neutrophils Absolute, Manual 6.16 (*)      Bands Absolute, Manual 0.08      Lymphocytes Absolute, Manual 1.00      Monocytes Absolute, Manual 0.31      Eosinophils Absolute, Manual 0.15      Basophils Absolute, Manual 0.00      Total Cells Counted 100      Neutrophils Absolute, Manual 6.24 (*)     RBC Morphology See Below      Ovalocytes Few      Jason Cells Few     AMMONIA - Normal    Ammonia 18     PROTIME-INR - Normal    Protime 11.9      INR 1.1     URINE CULTURE   URINALYSIS WITH REFLEX MICROSCOPIC AND CULTURE    Narrative:     The following orders were created for panel order Urinalysis with Reflex Microscopic and Culture.  Procedure                               Abnormality         Status                     ---------                               -----------         ------                     Urinalysis with Reflex M...[[id number]]  Abnormal            Final result               Extra Urine Gray Tube[[id number]]                            In process                   Please view results for these tests on the individual orders.   EXTRA URINE GRAY TUBE         ED Course & [facility]   ED Course as of 10/23/23 2025   Mon Oct 23, 2023   1434 Troponin I, High Sensitivity(!!): 120  2nd troponin ordered, pt without cp or sob [MK]   1446 ECG 12 lead  EKG showed NSR with rate of 7, normal axis, no ST changes, , . [MK]   1509 Lower extremity venous duplex right  D/w ultrasound tech, finding consistent with baker's cyst. [MK]   1514 Patient and her son updated on plan of care including abnormal xray and troponin and need for PE study. [MK]   1702 PE study changed to stat [MK]   1956 Updated pt and family on plan of care []      ED Course User Index  [] Janeth Lara PA-C         Diagnoses as of 10/23/23 2025   Elevated troponin   Right leg swelling   Synovial cyst of right popliteal space   Community acquired pneumonia, unspecified laterality   UTI (urinary tract infection), uncomplicated   Elevated brain natriuretic peptide (BNP) level       Medical  Decision Making    MDM: Patient is a 89-year-old female who presents today for evaluation of right knee pain however upon speaking with patient this is actually more calf pain and AMS that is improving, workup intiaated including RLE u/s.  Patient was found to have an elevated troponin of 120, EKG reviewed and did not reveal any ischemic changes, patient without any chest pain or shortness of breath, repeat troponin 125, proBNP was added in 971, per review of records her BNP has been elevated in the 100s but never this high, she had an echocardiogram 1 year ago without any obvious evidence of heart failure, EF of 65%.  She does have small leukocyte esterase and 4+ bacteria and 6-10 white blood cells, she also has pneumonia on chest x-ray and chest CT, will treat with azithromycin and Rocephin this will also cover for any potential UTI, CBC without leukocytosis or anemia, CMP showed a glucose of 102, BUN of 26, albumin of 3, total protein of 5.9 but otherwise normal, ammonia INR within normal limits, chest x-ray revealed left perihilar epidural infiltrate, I personally reviewed x-ray with Dr. Vuong and it appeared that patient had a an air-fluid level to the center of the chest, for this reason a CT PE study was obtained and x-ray showed a very large hiatal hernia, when I discussed this with the patient and family they were already aware.  Her lower extremity duplex was negative for DVT but did show a Baker's cyst which I was able to confirm with the ultrasound tech.  With patient's elevated troponin, proBNP I do have concerns for new onset heart failure, she also has pneumonia and evidence of UTI, started patient on azithromycin and Rocephin and will admit her to medicine for further evaluation and management.        Procedure  Procedures     Janeth Lara PA-C  10/23/23 2025

## 2023-10-24 NOTE — PROGRESS NOTES
Physical Therapy    Physical Therapy    Physical Therapy Evaluation & Treatment    Patient Name: Shaina Roberts  MRN: [id number]  Today's Date: 10/24/2023   Time Calculation  Start Time: 1016  Stop Time: 1031  Time Calculation (min): 15 min    Assessment/Plan   Pt requires 24 hour hands on assist for bed mobility, transfers and gait.  Pt requires encouragement to participate and cues for safety and technique.  Pt is easily fatigued.  Pt would benefit from continued, moderate intensity PT to improve independence with all functional mobility.    PT Assessment  PT Assessment Results: Decreased strength, Decreased endurance, Decreased mobility, Impaired balance, Decreased safety awareness  Rehab Prognosis: Good  End of Session Communication: Bedside nurse  End of Session Patient Position: Up in chair, Alarm on  IP OR SWING BED PT PLAN  Inpatient or Swing Bed: Inpatient  PT Plan  Treatment/Interventions: Bed mobility, Transfer training, Gait training, Balance training, Strengthening, Endurance training, Therapeutic exercise, Therapeutic activity (Pt education)  PT Plan: Skilled PT  PT Frequency: 3 times per week  PT Discharge Recommendations: Moderate intensity level of continued care  PT Recommended Transfer Status: Assist x1, Assistive device    Current Problem:  1. Elevated troponin        2. Pain in right leg  Lower extremity venous duplex right      3. Right leg swelling        4. Synovial cyst of right popliteal space        5. Community acquired pneumonia, unspecified laterality        6. UTI (urinary tract infection), uncomplicated        7. Elevated brain natriuretic peptide (BNP) level  Transthoracic Echo (TTE) Complete    Transthoracic Echo (TTE) Complete      8. Heart failure, diastolic, due to HTN (CMS/HCC)  Transthoracic Echo (TTE) Complete    Transthoracic Echo (TTE) Complete        Past Medical History:   Diagnosis Date    Essential (primary) hypertension     Benign essential HTN    Other conditions  influencing health status     History of renal stent     Past Surgical History:   Procedure Laterality Date    CHOLECYSTECTOMY  08/31/2018    Cholecystectomy    CORONARY ANGIOPLASTY WITH STENT PLACEMENT  08/31/2018    Cath Placement Of Stent 1    OTHER SURGICAL HISTORY  08/31/2018    Treatment Of Hip Fracture       Subjective     General Visit Information:  General  Reason for Referral: weakness, pneumonia, UTI  Referred By: Dr. Frye  Past Medical History Relevant to Rehab: COPD, HTN  Family/Caregiver Present: Yes  Prior to Session Communication: Bedside nurse  Patient Position Received:  (seated edge of bed with PCA)    Home Living:  Home Living  Home Living Comments:  (Pt lives home alone, ranch style home, no stairs to enter.)    Prior Level of Function:  Prior Function Per Pt/Caregiver Report  Prior Function Comments:  (Pt ambulates modified independent with a rollator, independent with ADL's, family completes household chores, pt is independent with light meal prep.)    Precautions:  Precautions  Medical Precautions: Fall precautions    Vital Signs:     Objective     Pain:  Pain Assessment  Pain Assessment: 0-10  Pain Score: 0 - No pain    Cognition:       General Assessments:                                Functional Assessments:        Transfers  Transfer:  (mod assist with max cues for safe hand placement with walker. Poor eccentric control to sit.)  Ambulation/Gait Training  Ambulation/Gait Training Performed:  (5 feet with front wheeled walker, min assist, unsteady, cues to keep walker close with approach to chair to sit.)          Extremity/Trunk Assessments:        RLE   RLE :  (R LE AROM WFL)  LLE   LLE :  (L LE AROM WFL)      Outcome Measures:  [facility] Basic Mobility  Turning from your back to your side while in a flat bed without using bedrails: A little  Moving from lying on your back to sitting on the side of a flat bed without using bedrails: A little  Moving to and from bed to chair (including a  wheelchair): A lot  Standing up from a chair using your arms (e.g. wheelchair or bedside chair): A lot  To walk in hospital room: A little  Climbing 3-5 steps with railing: A lot  Basic Mobility - Total Score: 15                            Goals:  Encounter Problems       Encounter Problems (Active)       PT Problem       PT Goal 1 (Progressing)       Start:  10/24/23    Expected End:  11/07/23       Pt able to perform bed mobility with CGA assist.           PT Goal 2 (Progressing)       Start:  10/24/23    Expected End:  11/07/23       Pt able to complete all transfers with min assist.            PT Goal 3 (Progressing)       Start:  10/24/23    Expected End:  11/07/23       Pt able to ambulate 25 feet with front wheeled walker and min assist.              Pain - Adult            Education Documentation  Precautions, taught by Deja Wallace, PT at 10/24/2023  2:01 PM.  Learner: Patient  Readiness: Acceptance  Method: Explanation  Response: Needs Reinforcement    Mobility Training, taught by Deja Wallace, PT at 10/24/2023  2:01 PM.  Learner: Patient  Readiness: Acceptance  Method: Explanation  Response: Needs Reinforcement    Education Comments  No comments found.

## 2023-10-24 NOTE — NURSING NOTE
EOS note:  Pt was admitted with PNA/UTI. Pt A&Ox4 and has a pleasant sense of humor. Vitals stable on RA. RN clarified with resident no need for tele. IV fluids and abx given in ED. Plan for echo to determine progression of CHF today. Pt stood at side of bed with 2x assists and walker. Plan for PT eval today. Pt lives at home alone but has kids help with meals, yard work, and medication management.

## 2023-10-24 NOTE — NURSING NOTE
EOS: Pt. Stable throughout this shift and was able to have an uneventful day. Pt. Had no acute changes throughout  this shift and no c/o pain. Pt. Was able to ambulate to the chair and in the halls this shift with staff assistance. Pt. Remained on RA with no c/o SOB. Pt. Fluid intake encouraged this shift. Pt. Did have one episode of diarrhea this shift. Pt. Family updated throughout this shift. Pt. Remained alert and oriented throughout this shift. Pt. Resting in chair at this time. Call light within reach. Chair alarm on. SCDs on.

## 2023-10-24 NOTE — PROGRESS NOTES
10/24/23 1137   Discharge Planning   Number of Stairs to Enter Residence 0   Number of Stairs Within Residence 0   Do you have animals or pets at home? No   Does the patient need discharge transport arranged? No   Patient Choice   Patient / Family choosing to utilize agency / facility established prior to hospitalization No     Spoke with patient and daughter, Chelsie, at bedside. Verified address and PCP. Patient's pharmacy is [facility] in [place] but mainly uses mail order pharmacy. Daughter states that family live very close to patient and keeps a close eye on patient. Will wait for therapy notes and follow with patient till discharge.

## 2023-10-24 NOTE — PROGRESS NOTES
"Shaina Roberts is a 89 y.o. female on day 1 of admission presenting with Community acquired pneumonia.    Subjective   Patient seen examined at bedside.  Patient reports she is doing little better.  Family at bedside.  Patient reports she does have a little bit of a cough and does feel, generalized weakness but denies any other acute complaints.  All questions answered.       Objective     Physical Exam  Constitutional: Well developed, no distress, alert and cooperative, elderly   Skin: Warm and dry  Eyes: EOMI, clear sclera  ENMT: mucous membranes moist  Respiratory: Patent airways, CTAB, dry cough  Cardiovascular: Regular, rate and rhythm  Abdominal: Nondistended, soft, non-tender, +BS  MSK: ROM intact, bilateral LE edema L>R, tender to palpation  Neuro: alert and oriented x3    Last Recorded Vitals  Blood pressure 166/85, pulse 78, temperature 36.5 °C (97.7 °F), temperature source Temporal, resp. rate 16, height 1.575 m (5' 2\"), weight 68.7 kg (151 lb 7.3 oz), SpO2 93 %.  Intake/Output last 3 Shifts:  I/O last 3 completed shifts:  In: 696.7 (10.1 mL/kg) [I.V.:696.7 (10.1 mL/kg)]  Out: - (0 mL/kg)   Weight: 68.7 kg     Relevant Results  Scheduled medications  atorvastatin, 40 mg, oral, Daily  azithromycin, 500 mg, intravenous, q24h  cefTRIAXone, 2 g, intravenous, q24h  enoxaparin, 40 mg, subcutaneous, Daily  ferrous sulfate, 1 tablet, oral, Daily  metoprolol succinate XL, 25 mg, oral, Nightly  mirtazapine, 15 mg, oral, Nightly  pantoprazole, 40 mg, oral, BID AC  PARoxetine, 10 mg, oral, Nightly  perflutren lipid microspheres, 0.5-10 mL of dilution, intravenous, Once in imaging  [Held by provider] potassium chloride CR, 10 mEq, oral, Daily      Continuous medications     PRN medications  PRN medications: oxygen  Results from last 7 days   Lab Units 10/24/23  0633 10/23/23  1322   WBC AUTO x10*3/uL 4.8 7.7   RBC AUTO x10*6/uL 3.76* 4.07   HEMOGLOBIN g/dL 11.8* 12.7     Results from last 7 days   Lab Units " 10/24/23  0633 10/23/23  1322   SODIUM mmol/L 140 140   POTASSIUM mmol/L 3.0* 3.5   CHLORIDE mmol/L 108* 106   CO2 mmol/L 21 24   BUN mg/dL 18 26*   CREATININE mg/dL 0.69 0.82   CALCIUM mg/dL 7.7* 8.6   BILIRUBIN TOTAL mg/dL  --  1.2   ALT U/L  --  11   AST U/L  --  12       ECG 12 lead    Result Date: 10/24/2023  Normal sinus rhythm Anteroseptal infarct (cited on or before 22-APR-2013) Abnormal ECG When compared with ECG of 30-AUG-2019 21:15, Nonspecific T wave abnormality now evident in Lateral leads Confirmed by Kulwant Pepper ([id number]) on 10/24/2023 11:39:59 AM    Lower extremity venous duplex right    Result Date: 10/23/2023            [facility] [place] [address] [place] [address]     Tel [phone] Fax [phone]  Vascular Lab Report  Lower Venous Duplex Ultrasound Patient Name:     ARMOND LOUISE LEE    Reading           [id number] Rafal Gil                                      Physician:        MD Study Date:       10/23/2023         Ordering          [id number] RAFAEL DIANA                                      Provider: MRN/PID:          [id number]           Fellow: Accession#:       [id number]       Technologist:     Sarah Rodriguez XIMENA,                                                        RDMS Date of           7/1/1932 / 89      Technologist 2: Birth/Age:        years Gender:           F                  Encounter#:       [id number] Admission Status: Emergency          Location          [facility]                                      Performed:  Diagnosis/ICD: Pain in right leg-M79.604 Indication:    Limb pain.  CONCLUSIONS: Right Lower Venous: No evidence of acute deep vein thrombus visualized in the right lower extremity. Left Lower Venous: Left common femoral vein is negative for deep vein thrombus. Additional Findings: There is a fluid collection in the right popliteal fossa that measures 3.7 x 1 x 2.2cm.  Comparison: Compared with study from 7/8/2022, no significant  change.No evidence of DVT.  Imaging & Doppler Findings:  Right                 Compressible Thrombus        Flow Distal External Iliac                None CFV                       Yes        None   Spontaneous/Phasic PFV                       Yes        None FV Proximal               Yes        None   Spontaneous/Phasic FV Mid                    Yes        None FV Distal                 Yes        None Popliteal                 Yes        None   Spontaneous/Phasic Peroneal                  Yes        None PTV                       Yes        None  Left Compress Thrombus        Flow CFV    Yes      None   Spontaneous/Phasic  [id number] Rafal Gil MD Electronically signed by [id number] Rafal Gil MD on 10/23/2023 at 8:17:44 PM  ** Final **     CT angio chest for pulmonary embolism    Result Date: 10/23/2023  STUDY: CT Angiogram of the Chest; 10/23/2023 6:51 PM. INDICATION: Abnormal XR chest, concern for empyema.  Patient with elevated troponin and recent travel.  Evaluate for pulmonary embolism. COMPARISON: XR chest 10/23/2023.  CT chest 10/18/2019. ACCESSION NUMBER(S): [id number] ORDERING CLINICIAN: RAFAEL DIANA TECHNIQUE:  CTA of the chest was performed with intravenous contrast. Images are reviewed and processed at a workstation according to the CT angiogram protocol with 3-D and/or MIP post processing imaging generated.  Omnipaque 350--60 mL was administered intravenously. Automated mA/kV exposure control was utilized and patient examination was performed in strict accordance with principles of ALARA. FINDINGS: Pulmonary arteries are adequately opacified without acute or chronic filling defects.  The thoracic aorta is normal in course and caliber without dissection or aneurysm. The heart is normal in size without pericardial effusion.  There is a large midline diaphragmatic hernia. Thoracic lymph nodes are not enlarged. There is no pleural effusion, pleural thickening, or pneumothorax. The airways are  patent. There are infiltrates in both upper and lower lobes. There are no suspicious lung masses or nodules. Upper abdomen demonstrates no acute pathology. There are no acute fractures.  No suspicious bony lesions.    1. No pulmonary emboli. 2. There are infiltrates in both upper and lower lobes which may represent pneumonia. 3. Large midline diaphragmatic hernia. Signed by Clifford Hernandez MD    XR chest 2 views    Result Date: 10/23/2023  STUDY: Chest Radiographs;  10/23/2023 1:50 PM. INDICATION: Altered mental status. COMPARISON: Chest radiographs 8/30/2019. ACCESSION NUMBER(S): [id number] ORDERING CLINICIAN: RAFAEL DIANA TECHNIQUE:  Frontal and lateral chest. FINDINGS: CARDIOMEDIASTINAL SILHOUETTE: Cardiomediastinal silhouette is normal in size and configuration.  LUNGS: There is a left perihilar infiltrate.  There is a left pleural effusion.  ABDOMEN: There is a retrocardiac mass consistent with a hiatal hernia.  BONES: There is scoliosis with thoracic curve to the left.    Left perihilar infiltrate with pleural effusion. Hiatal hernia. Signed by Raheel Davenport MD    CT head wo IV contrast    Result Date: 10/23/2023  Interpreted By:  Quentin Lamb, STUDY: CT HEAD WO IV CONTRAST; 10/23/2023 2:10 pm   INDICATION: AMS.   COMPARISON: CT head dated 02/07/2018   ACCESSION NUMBER(S): [id number]   ORDERING CLINICIAN: RAFAEL DIANA   TECHNIQUE: Contiguous axial CT images were obtained through the head at 5 mm slice thickness without contrast administration.   FINDINGS: INTRACRANIAL: The ventricles, sulci and basal cisterns are within normal limits for size and configuration. The grey-white differentiation is intact. There is no mass effect or midline shift. There is no extraaxial fluid collection. There is no intracranial hemorrhage.  The calvarium is unremarkable.   EXTRACRANIAL: Visualized paranasal sinuses and mastoids are clear.       No evidence of acute cortical infarct or intracranial hemorrhage.   MACRO:  None     Signed by: Quentin Lamb 10/23/2023 2:17 PM Dictation workstation:   [id number]    XR knee right 4+ views    Result Date: 10/23/2023  Interpreted By:  Quentin Lamb, STUDY: XR KNEE RIGHT 4+ VIEWS  10/23/2023 1:49 pm   INDICATION: Signs/Symptoms:r knee pain   COMPARISON: 09/02/2022   ACCESSION NUMBER(S): [id number]   ORDERING CLINICIAN: RAFAEL DIANA   TECHNIQUE: 4 views of the right knee including AP, lateral and bilateral oblique projections were obtained.   FINDINGS: There is no evidence of acute fracture or dislocation identified. Severe joint space narrowing and moderate marginal osteophyte formation is seen in the lateral compartment of the right. Mild degenerative changes are seen elsewhere in the right knee. Chondral calcifications are present, which can be seen with CPPD arthropathy. No suprapatellar joint effusion is present.       1. No acute fracture or dislocation. 2. Degenerative changes, as described above.   MACRO: None.   Signed by: Quentin Lamb 10/23/2023 2:16 PM Dictation workstation:   [id number]    ECG 12 lead (Clinic Performed)    Result Date: 10/23/2023  Abnormal-see scan       Assessment/Plan   Principal Problem:    Community acquired pneumonia  Active Problems:    Benign essential hypertension    Recurrent UTI    COPD (chronic obstructive pulmonary disease) (CMS/[facility])    Elevated troponin  Metabolic encephalopathy   CAD  HTN, HLD  Anxiety/depression    -CT- bilateral infiltrates   -continue rocephin/azithro   -COVID/FLU/RSV neg   -f/u legionella/strep pn.  -procal 0.38  -incentive spirometry   -robitussin PRN   -trop 112<125; likely demand in setting of pneumonia/infection   -f/u echo   -+UA   -f/u Ucx   -PT/OT    FENGI   -encourage oral hydration   -replete electrolytes PRN  -regular diet   -GI ppx: ppi  -DVT ppx: SCDs, lovenox    Dispo: 89-year-old female who was admitted for metabolic encephalopathy secondary to bilateral community-acquired pneumonia with UTI.  She was also  found to have mildly elevated troponins thought to be secondary to demand in the setting of pneumonia/infection.  Echo pending.  Currently receiving IV antibiotics.  PT/OT. Will likely stay greater than 2 midnights.      Lidia Frye, DO

## 2023-10-24 NOTE — H&P
"Subjective   Shaina Roberts is a 89 y.o. female who presents for Knee Pain (Pt states her PCP sent her for knee pain and \"IV fluids\").    She was originally sent to the ED by her PCP's office after she presented for Knee pain. She expressed a constellation of symptoms including mild confusion, fatigue, cough and leg swelling and was sent to the ED for further evaluation. HPI obtained form the patient as her daughter had just left. She is currently alert and oriented x3. She states she recently was in [place] where she was visiting her son who is ill from cancer. She arrived back to [place] last Thursday. Since coming back home she has had episodes of confusion to place and situation. She had trouble remembering that she wasn't in [place] anymore. She feels that her legs are a little more swollen than usual. She has little to no appetite and has not been drinking water. She starting having a productive cough a few days ago. No sick contacts that she is aware of. Denies fever, headache, nausea, vomiting, chest pain, shortness of breath.     ED Course: Since presentation she is afebrile and hemodynamically stable on room air. Labs significant for stable troponinemia of 120 x3 and . Urinalysis shows bacteruria with minimal pyuria, possible colonization. EKG shows NSR with known anteroseptal infarct, no acute ischemic changes. CTA of the chest shows no evidence of PE but is notable for bilateral infiltrates suggestive of pneumonia. DVT ruled out by lower extremity ultrasound.      Past Medical History:   Diagnosis Date    Essential (primary) hypertension     Benign essential HTN    Other conditions influencing health status     History of renal stent       Past Surgical History:   Procedure Laterality Date    CHOLECYSTECTOMY  08/31/2018    Cholecystectomy    CORONARY ANGIOPLASTY WITH STENT PLACEMENT  08/31/2018    Cath Placement Of Stent 1    OTHER SURGICAL HISTORY  08/31/2018    Treatment Of Hip Fracture       No " "family history on file.    MEDICATIONS  Infusions:     Scheduled:  atorvastatin, 40 mg, Daily  azithromycin, 500 mg, q24h  cefTRIAXone, 2 g, q24h  enoxaparin, 40 mg, Daily  ferrous sulfate, 1 tablet, Daily  lactated Ringer's, 500 mL, Once  metoprolol succinate XL, 25 mg, Nightly  mirtazapine, 15 mg, Nightly  pantoprazole, 40 mg, BID AC  PARoxetine, 10 mg, Nightly  perflutren lipid microspheres, 0.5-10 mL of dilution, Once in imaging  potassium chloride CR, 10 mEq, Daily      PRN:  oxygen, , Continuous PRN - O2/gases        Objective   Visit Vitals  /74   Pulse 86   Temp 36 °C (96.8 °F)   Resp (!) 27   Ht 1.549 m (5' 1\")   Wt 63 kg (139 lb)   SpO2 (!) 87%   BMI 26.26 kg/m²   Smoking Status Never   BSA 1.65 m²     Wt Readings from Last 5 Encounters:   10/23/23 63 kg (139 lb)   09/26/23 69.9 kg (154 lb)   06/22/23 68 kg (150 lb)   09/21/22 64.9 kg (143 lb 2 oz)   09/02/22 66.3 kg (146 lb 4 oz)     INTAKE/OUTPUT:  No intake/output data recorded.     Physical Exam  Constitutional:       General: She is not in acute distress.     Appearance: Normal appearance. She is not toxic-appearing.   HENT:      Head: Normocephalic.      Nose: No congestion or rhinorrhea.      Mouth/Throat:      Mouth: Mucous membranes are dry.      Pharynx: No oropharyngeal exudate.   Eyes:      Extraocular Movements: Extraocular movements intact.      Conjunctiva/sclera: Conjunctivae normal.      Pupils: Pupils are equal, round, and reactive to light.   Cardiovascular:      Rate and Rhythm: Normal rate and regular rhythm.      Pulses: Normal pulses.      Heart sounds: No murmur heard.     Comments: Bilateral lower extremity lymphedema L>R  Pulmonary:      Effort: Pulmonary effort is normal. No respiratory distress.      Breath sounds: Normal breath sounds. No wheezing.   Abdominal:      General: There is no distension.      Palpations: Abdomen is soft.      Tenderness: There is no abdominal tenderness.   Musculoskeletal:      Cervical back: " "Normal range of motion.      Right lower le+ Edema present.      Left lower le+ Edema present.   Skin:     General: Skin is warm and dry.   Neurological:      General: No focal deficit present.      Mental Status: She is alert and oriented to person, place, and time. Mental status is at baseline.   Psychiatric:         Mood and Affect: Mood normal.         Behavior: Behavior normal.         DATA:  CMP:  Results from last 7 days   Lab Units 10/23/23  1322   SODIUM mmol/L 140   POTASSIUM mmol/L 3.5   CHLORIDE mmol/L 106   CO2 mmol/L 24   ANION GAP mmol/L 14   BUN mg/dL 26*   CREATININE mg/dL 0.82   EGFR mL/min/1.73m*2 68   ALBUMIN g/dL 3.0*   ALT U/L 11   AST U/L 12   BILIRUBIN TOTAL mg/dL 1.2     CBC:  Results from last 7 days   Lab Units 10/23/23  1322   WBC AUTO x10*3/uL 7.7   HEMOGLOBIN g/dL 12.7   HEMATOCRIT % 38.7   PLATELETS AUTO x10*3/uL 246   MCV fL 95     COAG:   Results from last 7 days   Lab Units 10/23/23  1322   INR  1.1     ABO: No results found for: \"ABO\"  HEME/ENDO:     CARDIAC:   Results from last 7 days   Lab Units 10/23/23  2220 10/23/23  1535 10/23/23  1322   TROPHS ng/L 112* 125* 120*   BNP pg/mL  --  971*  --          Imaging:  EKG:   NSR, known anteroseptal infarct, no acute ischemic changes    Chest Radiograph:  Left perihilar infiltrate with pleural effusion.   Hiatal hernia.     CTA chest :   1. No pulmonary emboli.   2. There are infiltrates in both upper and lower lobes which may   represent pneumonia.   3. Large midline diaphragmatic hernia       Prior to Admission Meds:  (Not in a hospital admission)      Assessment/Plan   Problem List Items Addressed This Visit          Cardiac and Vasculature    Elevated troponin - Primary       Pulmonary and Pneumonias    * (Principal) Community acquired pneumonia     Other Visit Diagnoses       Pain in right leg        Right leg swelling        Synovial cyst of right popliteal space        UTI (urinary tract infection), uncomplicated        " Elevated brain natriuretic peptide (BNP) level        Relevant Orders    Transthoracic Echo (TTE) Complete    Heart failure, diastolic, due to HTN (CMS/HCC)        Relevant Medications    metoprolol succinate XL (Toprol-XL) 24 hr tablet 25 mg    Other Relevant Orders    Transthoracic Echo (TTE) Complete          ASSESSMENT AND PLAN:   Community acquired pneumonia  Mild encephalopathy, metabolic vs infectious vs pseudodementia  Elevated BNP  Elevated troponinemia  Abnormal UA with bacteruria  H/o CAD s/p PCI  H/o COPD  H/o HTN  H/o HLD  H/o anxiety/depression    Presenting with a constellation of symptoms including generalized fatigue, and mild encephalopathy. She does have significant infiltrates on chest imaging highly suspicious for PNA. Lower suspicion for primary cardiac disorder but given her h/o is ischemia and elevated BNP will check TTE. Suspect her abnormal UA is colonization however the abx have some urinary coverage and culture is pending. Her encephalopathy, which currently is resolved, could be related to infection vs poor oral intake vs age related cognitive impairment but also should consider pseudodementia given h/o of depression and recently getting to visit her son whom she says is quite ill from cancer.     Plan:  -Continue IV antibiotics with rocephin and azothromycin  -COVID, Flu, RSV negative  -Procalcitonin pending  -Tranthoracic echo, previously preserved EF  -IV fluid bolus with LR   -Nutrition consultation  -PT/OT    DVT: lovenox  NUTRITION: Adult diet Regular  CODE STATUS: DNR and No Intubation  DISPO:  Admit for SARAH Browne DO

## 2023-10-24 NOTE — CONSULTS
Assessment Subjective/Objective:   Note Type:  Initial   Reason for Assessment: consult for nutrition recommendations  Note Authored by: Registered Dietitian Nutritionist   Pager Number: [phone] secure chat     Nutrition Note:  Shaina Roberts is a 89 y.o. female admitted after presenting to ED with knee pain stating she was dehydrated after a road trip to [place] to visit family.  IV fluids provided in ED.  Pt admitted with PNA/UTI.  Pt to have echo today per nursing notation.    Past Medical History   has a past medical history of Essential (primary) hypertension and Other conditions influencing health status.  Surgical History   has a past surgical history that includes Other surgical history (08/31/2018); Cholecystectomy (08/31/2018); and Coronary angioplasty with stent (08/31/2018).    Objective Information:    Pain: Pain Assessment: 0-10  Pain Score: 0 - No pain  Pain Location: Knee  Pain Orientation: Right    Vitals: Visit Vitals  /81 (Patient Position: Standing) Comment: Standing Orthostatic Vitals   Pulse 94   Temp 36.5 °C (97.7 °F) (Temporal)   Resp 16        Height/Weight:  Height:    Vitals:    10/24/23 0043   Weight: 68.7 kg (151 lb 7.3 oz)      Body mass index is 27.7 kg/m².   Weight history/ % weight change: poor weight history on record, per pt UBW is around 150#  Significant Weight Loss: no     Recent Lab Results:  Lab Results   Component Value Date    GLUCOSE 86 10/24/2023    CALCIUM 7.7 (L) 10/24/2023     10/24/2023    K 3.0 (L) 10/24/2023    CO2 21 10/24/2023     (H) 10/24/2023    BUN 18 10/24/2023    CREATININE 0.69 10/24/2023       Medications:  Scheduled medications  atorvastatin, 40 mg, oral, Daily  azithromycin, 500 mg, intravenous, q24h  cefTRIAXone, 2 g, intravenous, q24h  enoxaparin, 40 mg, subcutaneous, Daily  ferrous sulfate, 1 tablet, oral, Daily  metoprolol succinate XL, 25 mg, oral, Nightly  mirtazapine, 15 mg, oral, Nightly  pantoprazole, 40 mg, oral, BID  AC  PARoxetine, 10 mg, oral, Nightly  perflutren lipid microspheres, 0.5-10 mL of dilution, intravenous, Once in imaging  [Held by provider] potassium chloride CR, 10 mEq, oral, Daily    Nutrition Orders:  Dietary Orders (From admission, onward)       Start     Ordered    10/24/23 0057  May Participate in Room Service With Assistance  Once        Question:  .  Answer:  Yes    10/24/23 0057    10/24/23 0003  Adult diet Regular  Diet effective now        Question:  Diet type  Answer:  Regular    10/24/23 0002                     Food/Nutrition Related History:  Energy Intake: 100%  Food/Nutrition Related History: Pt with 100% of last two meals eaten per flow sheets.  Nursing noting good appetite.  Pt states that she typically does not eat breakfast and usually just eats lunch and dinner meals.  She notes she knows she did not drink enough during her trip and feels like that's what caused her to get sick.  Pt not sure if she takes a multivitamin or not, but states that her daughter cooks all meals for her.   GI Symptoms:  diarrhea noted to be intermittent, not now per pt   Oral Problems: none   Mobility: difficulty with normal activity     Food Allergies: NKFA   Nutritional Supplements: none     Nutrition Focused Physical Findings: deferred at this time per pt, appears nourished with some age related muscle wasting/fat loss    Estimated Needs:   Estimated Energy Needs  Total Energy Estimated Needs (kCal):  (3717-1098)  Total Estimated Energy Need per Day (kCal/kg):  (25-30)    Estimated Protein Needs  Total Protein Estimated Needs (g):  (69-82)  Total Protein Estimated Needs (g/kg):  (1.0-1.2)    Estimated Fluid Needs  Total Fluid Estimated Needs (mL):  (1ml/kcal or per MD recs)   mL fluid/day: 1mL/kcal/day or as per physician.     Nutrition Diagnosis:     Patient has Nutrition Diagnosis: Yes  Nutrition Diagnosis 1: Inability to manage self care  Diagnosis Status (1): New  Related to (1): difficulty with mobility  needed for coooking  As Evidenced by (1): pt statement that family cooks all of her meals for her.    Nutrition Goals:   adequate PO fluid intakes, lab values within normal limits, electrolytes within normal limits, promote healing, maintain stable wt   Nutrition Goal Outcomes: Will assess progress towards goals upon follow-up.     Nutrition Therapy Interventions/Recommendations:   Recommendations:   Diet: Recommend continue regular diet at this time for liberalized approach to promote PO intakes.  Oral Nutrition Supplements: not needed at this time  Coordination of care: MARLON Reardon  Nutrition Education:  Encouraged adequate fluid intakes and educated on eating schedule to meet overall protein/calorie needs     Dietitian Monitoring and Evaluation Plan:  Monitoring and Evaluation Plan: PO intake/tolerance, labs, fluid intake/adequacy, weight trend, skin healing/integrity, overall appearance, meal behavior, skin healing/integrity     Follow Up  Time Spent (min): 45 minutes  Last Date of Nutrition Visit: 10/24/23  Nutrition Follow-Up Needed?: Dietitian to reassess per policy

## 2023-10-25 NOTE — PROGRESS NOTES
Physical Therapy    Physical Therapy Treatment    Patient Name: Shaina Roberts  MRN: [id number]  Today's Date: 10/25/2023  Time Calculation  Start Time: 1300  Stop Time: 1330  Time Calculation (min): 30 min       Assessment/Plan   PT Assessment  PT Assessment Results: Decreased strength, Decreased endurance, Impaired balance, Decreased mobility  Rehab Prognosis: Good  Strengths: Ability to acquire knowledge, Support of Caregivers  End of Session Communication: Bedside nurse, PCT/NA/CTA  End of Session Patient Position: Up in chair, Alarm on  PT Plan  Inpatient/Swing Bed or Outpatient: Inpatient  PT Plan  Treatment/Interventions: Bed mobility, Transfer training, Gait training  PT Plan: Skilled PT  PT Frequency: 3 times per week  PT Discharge Recommendations: Moderate intensity level of continued care  PT Recommended Transfer Status: Assist x1, Contact guard     10/25/23 1300   PT  Visit   PT Received On 10/25/23   Response to Previous Treatment Patient with no complaints from previous session.   General   Prior to Session Communication Bedside nurse   Patient Position Received Bed, 3 rail up;Alarm on   Preferred Learning Style verbal;visual   Precautions   Medical Precautions Fall precautions   Vital Signs   SpO2 93 %  (Room air)   Pain Assessment   Pain Assessment 0-10   Pain Score 0 - No pain   Pain Location Knee   Pain Orientation Right   Pain Radiating Towards entire RLE   Cognition   Orientation Level Oriented X4   Postural Control   Trunk Control flexed posture, kyphotic   General Observation   General Observation several rest breaks to complete functional activity due to SOB   Bed Mobility   Bed Mobility Yes   Bed Mobility 1   Bed Mobility 1 Supine to sitting   Level of Assistance 1 Minimum assistance   Bed Mobility Comments 1 increased pain RLE, extra time needed to complete   Ambulation/Gait Training   Ambulation/Gait Training Performed Yes   Ambulation/Gait Training 1   Surface 1 Level tile   Device 1  Rolling walker   Gait Support Devices Gait belt   Assistance 1 Contact guard   Quality of Gait 1 Inconsistent stride length   Comments/Distance (ft) 1 x55' x2, flexed posture, several rest breaks to complete distance.   Transfers   Transfer Yes   Transfer 1   Transfer From 1 Bed to   Transfer to 1 Stand   Technique 1 Sit to stand   Transfer Device 1 Gait belt;Walker   Transfer Level of Assistance 1 Minimum assistance   Trials/Comments 1 x1 assist   Transfers 2   Transfer From 2 Stand to   Transfer to 2 Chair with arms   Technique 2 Stand to sit   Transfer Device 2 Walker   Transfer Level of Assistance 2 Contact guard   Trials/Comments 2 cues for hand placement   Activity Tolerance   Endurance Tolerates 30 min exercise with multiple rests   Early Mobility/Exercise Safety Screen Proceed with mobilization - No exclusion criteria met   PT Assessment   PT Assessment Results Decreased strength;Decreased endurance;Impaired balance;Decreased mobility   Rehab Prognosis Good   Strengths Ability to acquire knowledge;Support of Caregivers   End of Session Communication Bedside nurse;PCT/NA/CTA   End of Session Patient Position Up in chair;Alarm on   Education   Individual(s) Educated Patient   Education Provided Fall Risk   Education Comment instructed to be up 3+ x per day ambulating with staff to tolerance as well as calling to use bathroom and not relying on purewick.   PT Plan   Inpatient/Swing Bed or Outpatient Inpatient   PT Plan   Treatment/Interventions Bed mobility;Transfer training;Gait training   PT Plan Skilled PT   PT Frequency 3 times per week   PT Discharge Recommendations Moderate intensity level of continued care   PT Recommended Transfer Status Assist x1;Contact guard     Outcome Measures:  [facility] Basic Mobility  Turning from your back to your side while in a flat bed without using bedrails: A little  Moving from lying on your back to sitting on the side of a flat bed without using bedrails: A little  Moving to  and from bed to chair (including a wheelchair): A little  Standing up from a chair using your arms (e.g. wheelchair or bedside chair): A little  To walk in hospital room: A little  Climbing 3-5 steps with railing: A lot  Basic Mobility - Total Score: 17  Education Documentation  Mobility Training, taught by Silverio Marcelo PTA at 10/25/2023  1:39 PM.  Learner: Patient  Readiness: Acceptance  Method: Explanation  Response: Verbalizes Understanding    Education Comments  No comments found.        Current Problem:  1. Elevated troponin        2. Pain in right leg  Lower extremity venous duplex right      3. Right leg swelling        4. Synovial cyst of right popliteal space        5. Community acquired pneumonia, unspecified laterality        6. UTI (urinary tract infection), uncomplicated        7. Elevated brain natriuretic peptide (BNP) level  Transthoracic Echo (TTE) Complete    Transthoracic Echo (TTE) Complete      8. Heart failure, diastolic, due to HTN (CMS/HCC)  Transthoracic Echo (TTE) Complete    Transthoracic Echo (TTE) Complete      EDUCATION:  Education  Individual(s) Educated: Patient  Education Provided: Fall Risk  Education Comment: instructed to be up 3+ x per day ambulating with staff to tolerance as well as calling to use bathroom and not relying on purewick.  Encounter Problems       Encounter Problems (Active)       PT Problem       PT Goal 1 (Progressing)       Start:  10/24/23    Expected End:  11/07/23       Pt able to perform bed mobility with CGA assist.           PT Goal 2 (Progressing)       Start:  10/24/23    Expected End:  11/07/23       Pt able to complete all transfers with min assist.            PT Goal 3 (Progressing)       Start:  10/24/23    Expected End:  11/07/23       Pt able to ambulate 25 feet with front wheeled walker and min assist.              Pain - Adult

## 2023-10-25 NOTE — PROGRESS NOTES
"Shaina Roberts is a 89 y.o. female on day 2 of admission presenting with Community acquired pneumonia.    Subjective   Patient seen examined at bedside.  Patient reports she is doing well but has little appetite. She agrees to try to eat more. All questions answered. Per nursing, family would like pt to go home on d/c opposed to SNF.     Objective     Physical Exam  Constitutional: Well developed, no distress, alert and cooperative, elderly   Skin: Warm and dry  Eyes: EOMI, clear sclera  ENMT: mucous membranes moist  Respiratory: Patent airways, CTAB  Cardiovascular: Regular, rate and rhythm  Abdominal: Nondistended, soft, non-tender, +BS  MSK: ROM intact, bilateral LE edema L>R, tender to palpation  Neuro: alert and oriented x3    Last Recorded Vitals  Blood pressure 148/74, pulse 64, temperature 36.6 °C (97.9 °F), resp. rate 18, height 1.575 m (5' 2\"), weight 68.7 kg (151 lb 7.3 oz), SpO2 94 %.  Intake/Output last 3 Shifts:  I/O last 3 completed shifts:  In: 1295 (18.9 mL/kg) [P.O.:995; IV Piggyback:300]  Out: 100 (1.5 mL/kg) [Urine:100 (0 mL/kg/hr)]  Weight: 68.7 kg     Relevant Results  Scheduled medications  atorvastatin, 40 mg, oral, Daily  azithromycin, 500 mg, intravenous, q24h  cefTRIAXone, 2 g, intravenous, q24h  enoxaparin, 40 mg, subcutaneous, Daily  ferrous sulfate, 1 tablet, oral, Daily  metoprolol succinate XL, 25 mg, oral, Nightly  mirtazapine, 15 mg, oral, Nightly  pantoprazole, 40 mg, oral, BID AC  PARoxetine, 10 mg, oral, Nightly  perflutren lipid microspheres, 0.5-10 mL of dilution, intravenous, Once in imaging  [Held by provider] potassium chloride CR, 10 mEq, oral, Daily      Continuous medications     PRN medications  PRN medications: oxygen  Results from last 7 days   Lab Units 10/25/23  0640 10/24/23  0633 10/23/23  1322   WBC AUTO x10*3/uL 4.3* 4.8 7.7   RBC AUTO x10*6/uL 3.76* 3.76* 4.07   HEMOGLOBIN g/dL 11.6* 11.8* 12.7       Results from last 7 days   Lab Units 10/25/23  0640 " 10/24/23  0633 10/23/23  1322   SODIUM mmol/L 139 140 140   POTASSIUM mmol/L 3.1* 3.0* 3.5   CHLORIDE mmol/L 107 108* 106   CO2 mmol/L 22 21 24   BUN mg/dL 13 18 26*   CREATININE mg/dL 0.65 0.69 0.82   CALCIUM mg/dL 7.6* 7.7* 8.6   PHOSPHORUS mg/dL 2.2*  --   --    MAGNESIUM mg/dL 1.67  --   --    BILIRUBIN TOTAL mg/dL  --   --  1.2   ALT U/L  --   --  11   AST U/L  --   --  12         ECG 12 lead    Result Date: 10/24/2023  Normal sinus rhythm Anteroseptal infarct (cited on or before 22-APR-2013) Abnormal ECG When compared with ECG of 30-AUG-2019 21:15, Nonspecific T wave abnormality now evident in Lateral leads Confirmed by Kulwant Pepper ([id number]) on 10/24/2023 11:39:59 AM    Lower extremity venous duplex right    Result Date: 10/23/2023            [facility] [address] [id number]     Tel [phone] Fax [phone]  Vascular Lab Report  Lower Venous Duplex Ultrasound Patient Name:     ARMOND GIL ROSA    Reading           [id number] Rafal Gil                                      Physician:        MD Study Date:       10/23/2023         Ordering          [id number] RAFAEL DIANA                                      Provider: MRN/PID:          [id number]           Fellow: Accession#:       [id number]       Technologist:     Sarah Rodriguez XIMENA,                                                        RDMS Date of           7/1/1932 / 89      Technologist 2: Birth/Age:        years Gender:           F                  Encounter#:       [id number] Admission Status: Emergency          Location          [facility]                                      Performed:  Diagnosis/ICD: Pain in right leg-M79.604 Indication:    Limb pain.  CONCLUSIONS: Right Lower Venous: No evidence of acute deep vein thrombus visualized in the right lower extremity. Left Lower Venous: Left common femoral vein is negative for deep vein thrombus. Additional Findings: There is a fluid collection in the right  popliteal fossa that measures 3.7 x 1 x 2.2cm.  Comparison: Compared with study from 7/8/2022, no significant change.No evidence of DVT.  Imaging & Doppler Findings:  Right                 Compressible Thrombus        Flow Distal External Iliac                None CFV                       Yes        None   Spontaneous/Phasic PFV                       Yes        None FV Proximal               Yes        None   Spontaneous/Phasic FV Mid                    Yes        None FV Distal                 Yes        None Popliteal                 Yes        None   Spontaneous/Phasic Peroneal                  Yes        None PTV                       Yes        None  Left Compress Thrombus        Flow CFV    Yes      None   Spontaneous/Phasic  [id number] Rafal Gil MD Electronically signed by [id number] Rafal Gil MD on 10/23/2023 at 8:17:44 PM  ** Final **     CT angio chest for pulmonary embolism    Result Date: 10/23/2023  STUDY: CT Angiogram of the Chest; 10/23/2023 6:51 PM. INDICATION: Abnormal XR chest, concern for empyema.  Patient with elevated troponin and recent travel.  Evaluate for pulmonary embolism. COMPARISON: XR chest 10/23/2023.  CT chest 10/18/2019. ACCESSION NUMBER(S): [id number] ORDERING CLINICIAN: RAFAEL DIANA TECHNIQUE:  CTA of the chest was performed with intravenous contrast. Images are reviewed and processed at a workstation according to the CT angiogram protocol with 3-D and/or MIP post processing imaging generated.  Omnipaque 350--60 mL was administered intravenously. Automated mA/kV exposure control was utilized and patient examination was performed in strict accordance with principles of ALARA. FINDINGS: Pulmonary arteries are adequately opacified without acute or chronic filling defects.  The thoracic aorta is normal in course and caliber without dissection or aneurysm. The heart is normal in size without pericardial effusion.  There is a large midline diaphragmatic hernia. Thoracic lymph  nodes are not enlarged. There is no pleural effusion, pleural thickening, or pneumothorax. The airways are patent. There are infiltrates in both upper and lower lobes. There are no suspicious lung masses or nodules. Upper abdomen demonstrates no acute pathology. There are no acute fractures.  No suspicious bony lesions.    1. No pulmonary emboli. 2. There are infiltrates in both upper and lower lobes which may represent pneumonia. 3. Large midline diaphragmatic hernia. Signed by Clifford Hernandez MD    XR chest 2 views    Result Date: 10/23/2023  STUDY: Chest Radiographs;  10/23/2023 1:50 PM. INDICATION: Altered mental status. COMPARISON: Chest radiographs 8/30/2019. ACCESSION NUMBER(S): [id number] ORDERING CLINICIAN: RAFAEL DIANA TECHNIQUE:  Frontal and lateral chest. FINDINGS: CARDIOMEDIASTINAL SILHOUETTE: Cardiomediastinal silhouette is normal in size and configuration.  LUNGS: There is a left perihilar infiltrate.  There is a left pleural effusion.  ABDOMEN: There is a retrocardiac mass consistent with a hiatal hernia.  BONES: There is scoliosis with thoracic curve to the left.    Left perihilar infiltrate with pleural effusion. Hiatal hernia. Signed by Raheel Davenport MD    CT head wo IV contrast    Result Date: 10/23/2023  Interpreted By:  Quentin Lamb, STUDY: CT HEAD WO IV CONTRAST; 10/23/2023 2:10 pm   INDICATION: AMS.   COMPARISON: CT head dated 02/07/2018   ACCESSION NUMBER(S): [id number]   ORDERING CLINICIAN: RAFAEL DIANA   TECHNIQUE: Contiguous axial CT images were obtained through the head at 5 mm slice thickness without contrast administration.   FINDINGS: INTRACRANIAL: The ventricles, sulci and basal cisterns are within normal limits for size and configuration. The grey-white differentiation is intact. There is no mass effect or midline shift. There is no extraaxial fluid collection. There is no intracranial hemorrhage.  The calvarium is unremarkable.   EXTRACRANIAL: Visualized paranasal sinuses  and mastoids are clear.       No evidence of acute cortical infarct or intracranial hemorrhage.   MACRO: None     Signed by: Quentin Lamb 10/23/2023 2:17 PM Dictation workstation:   [id number]    XR knee right 4+ views    Result Date: 10/23/2023  Interpreted By:  Quentin Lamb, STUDY: XR KNEE RIGHT 4+ VIEWS  10/23/2023 1:49 pm   INDICATION: Signs/Symptoms:r knee pain   COMPARISON: 09/02/2022   ACCESSION NUMBER(S): [id number]   ORDERING CLINICIAN: RAFAEL DIANA   TECHNIQUE: 4 views of the right knee including AP, lateral and bilateral oblique projections were obtained.   FINDINGS: There is no evidence of acute fracture or dislocation identified. Severe joint space narrowing and moderate marginal osteophyte formation is seen in the lateral compartment of the right. Mild degenerative changes are seen elsewhere in the right knee. Chondral calcifications are present, which can be seen with CPPD arthropathy. No suprapatellar joint effusion is present.       1. No acute fracture or dislocation. 2. Degenerative changes, as described above.   MACRO: None.   Signed by: Quentin Lamb 10/23/2023 2:16 PM Dictation workstation:   [id number]    ECG 12 lead (Clinic Performed)    Result Date: 10/23/2023  Abnormal-see scan       Assessment/Plan   Principal Problem:    Community acquired pneumonia  Active Problems:    Benign essential hypertension    Recurrent UTI    COPD (chronic obstructive pulmonary disease) (CMS/HCC)    Elevated troponin  Metabolic encephalopathy   CAD  HTN, HLD  Anxiety/depression    -CT- bilateral infiltrates   -continue rocephin/azithro   -COVID/FLU/RSV neg   -legionella/strep pn. - both neg  -procal 0.38  -incentive spirometry   -robitussin PRN   -trop 112<125; likely demand in setting of pneumonia/infection   -echo - normal EF, diastolic dysfunction, other findings noted   -+UA   -Ucx - enteric bacilli  -PT/OT    FENGI   -encourage oral hydration   -replete electrolytes PRN  -regular diet   -GI ppx:  ppi  -DVT ppx: SCDs, lovenox    Dispo: 89-year-old female who was admitted for metabolic encephalopathy secondary to bilateral community-acquired pneumonia with UTI.  She was also found to have mildly elevated troponins thought to be secondary to demand in the setting of pneumonia/infection.  Echo reviewed.  Ucx growing enteric bacilli. Currently receiving IV antibiotics.  PT/OT. Will likely stay greater than 2 midnights.      Lidia Frye, DO

## 2023-10-25 NOTE — NURSING NOTE
0900 assessment and vitals maintained.     1100 patient worked with physical therapy  walked in hallway and assisted to chair. Family at bedside and updated on plan of care.     1700 end of shift note, patient ambulated in hallway with walker gait belt and pca. No acute changes this shift.

## 2023-10-25 NOTE — NURSING NOTE
EOS: Patient remained A&O x4 throughout this shift noting no complaints of pain from patient. Patient tolerated IV antibiotics well and rested quietly during the night with SCDs on. Patient safety maintained with bed alarm on and audible and call bell/possessions within reach. Patient was cleansed of INC as needed and repositioned herself as needed throughout this shift.

## 2023-10-25 NOTE — CARE PLAN
The patient's goals for the shift include      The clinical goals for the shift include see POC

## 2023-10-25 NOTE — PROGRESS NOTES
Therapy recommends SNF so provider list given to patient for FOC. Informed patient of therapy recommendations.     3294- Spoke to son via phone and  son states he is not sure about SNF.  Son will talk to family and this TCC will follow up for discharge planning.     4556- Son Lul in room with patient. They would like home with [facility]. Declining SNF at this time. [facility] list provided.       Jesika Duffy RN

## 2023-10-25 NOTE — PROGRESS NOTES
Occupational Therapy                 Therapy Communication Note    Patient Name: Shaina Roberts  MRN: [id number]  Today's Date: 10/25/2023     Discipline: Occupational Therapy    Missed Visit Reason: Missed Visit Reason: Patient in a medical procedure (off unit at biometrics, will continue to follow and reattempt as able)    Missed Time: Attempt

## 2023-10-26 PROBLEM — I10 BENIGN ESSENTIAL HYPERTENSION: Status: RESOLVED | Noted: 2023-01-01 | Resolved: 2023-01-01

## 2023-10-26 NOTE — PROGRESS NOTES
Sent final orders to [facility]. Confirmed start of care is 10/27/23.      Ian Martinez

## 2023-10-26 NOTE — PROGRESS NOTES
[facility] is able to accept and is agency of choice. Pt/patient's son updated at bedside. Will need to send final home care orders. N updated.     1536- Notified [facility] that pt will d/c home today. Will need to send final orders once completed. [facility] to start care tomorrow.       Awilda Lebron RN

## 2023-10-26 NOTE — PROGRESS NOTES
Physical Therapy    Physical Therapy Treatment    Patient Name: Shaina Roberts  MRN: [id number]  Today's Date: 10/26/2023  Time Calculation  Start Time: 0950  Stop Time: 1020  Time Calculation (min): 30 min       Assessment/Plan   PT Assessment  PT Assessment Results: Decreased strength, Decreased endurance, Impaired balance, Decreased mobility  Rehab Prognosis: Good  Strengths: Ability to acquire knowledge, Support of Caregivers  End of Session Communication: Bedside nurse  End of Session Patient Position: Up in chair, Alarm on  PT Plan  Inpatient/Swing Bed or Outpatient: Inpatient  PT Plan  Treatment/Interventions: Bed mobility  PT Plan: Skilled PT  PT Frequency: 3 times per week  PT Discharge Recommendations: Moderate intensity level of continued care  PT Recommended Transfer Status: Assist x1, Contact guard     10/26/23 0950   PT  Visit   PT Received On 10/26/23   Response to Previous Treatment Patient with no complaints from previous session.   General   Family/Caregiver Present Yes   Prior to Session Communication Bedside nurse   Patient Position Received Bed, 3 rail up;Alarm on   Preferred Learning Style verbal;visual   Precautions   Medical Precautions Fall precautions   Pain Assessment   Pain Assessment 0-10   Pain Score 0 - No pain   Cognition   Orientation Level Oriented X4   Postural Control   Trunk Control flexed posture, kyphotic   Therapeutic Exercise   Therapeutic Exercise Performed Yes   Therapeutic Exercise Activity 1 DF/PF x15   Therapeutic Exercise Activity 2 resisted foot press x15   Therapeutic Exercise Activity 3 heel slides x15   Therapeutic Exercise Activity 4 hip ABD x15   Therapeutic Exercise Activity 5 pillow squeeze x15   Therapeutic Exercise Activity 6 marches x15   Therapeutic Exercise Activity 7 LAQ x15   Ambulation/Gait Training   Ambulation/Gait Training Performed Yes   Ambulation/Gait Training 1   Surface 1 Level tile   Device 1 Rolling walker   Gait Support Devices Gait belt    Assistance 1 Contact guard   Quality of Gait 1 Inconsistent stride length   Comments/Distance (ft) 1 35' x2, flexed posture. x1 short standing rest break to complete.   Transfers   Transfer Yes   Transfer 1   Transfer From 1 Sit to   Transfer to 1 Stand   Technique 1 Sit to stand;Stand to sit   Transfer Device 1 Walker;Gait belt   Transfer Level of Assistance 1 Minimum assistance   Trials/Comments 1 x1 assist   Activity Tolerance   Endurance Tolerates 30 min exercise with multiple rests   Early Mobility/Exercise Safety Screen Proceed with mobilization - No exclusion criteria met   PT Assessment   PT Assessment Results Decreased strength;Decreased endurance;Impaired balance;Decreased mobility   Rehab Prognosis Good   End of Session Communication Bedside nurse   End of Session Patient Position Up in chair;Alarm on   Education   Individual(s) Educated Patient   Education Provided Fall Risk   PT Plan   Inpatient/Swing Bed or Outpatient Inpatient   PT Plan   Treatment/Interventions Bed mobility   PT Plan Skilled PT   PT Frequency 3 times per week   PT Discharge Recommendations Moderate intensity level of continued care   PT Recommended Transfer Status Assist x1;Contact guard     Outcome Measures:  [facility] Basic Mobility  Turning from your back to your side while in a flat bed without using bedrails: A little  Moving from lying on your back to sitting on the side of a flat bed without using bedrails: A little  Moving to and from bed to chair (including a wheelchair): A little  Standing up from a chair using your arms (e.g. wheelchair or bedside chair): A little  To walk in hospital room: A little  Climbing 3-5 steps with railing: A lot  Basic Mobility - Total Score: 17  Education Documentation  Mobility Training, taught by Silverio Marcelo PTA at 10/26/2023 10:20 AM.  Learner: Patient  Readiness: Eager  Method: Explanation, Demonstration  Response: Verbalizes Understanding, Demonstrated Understanding    Mobility Training,  taught by Silverio Marcelo PTA at 10/25/2023  1:39 PM.  Learner: Patient  Readiness: Acceptance  Method: Explanation  Response: Verbalizes Understanding    Education Comments  No comments found.        EDUCATION:  Education  Individual(s) Educated: Patient  Education Provided: Fall Risk  Education Comment: instructed to be up 3+ x per day ambulating with staff to tolerance as well as calling to use bathroom and not relying on purewick.    GOALS:  Encounter Problems       Encounter Problems (Active)       PT Problem       PT Goal 1 (Progressing)       Start:  10/24/23    Expected End:  11/07/23       Pt able to perform bed mobility with CGA assist.           PT Goal 2 (Progressing)       Start:  10/24/23    Expected End:  11/07/23       Pt able to complete all transfers with min assist.            PT Goal 3 (Progressing)       Start:  10/24/23    Expected End:  11/07/23       Pt able to ambulate 25 feet with front wheeled walker and min assist.              Pain - Adult

## 2023-10-26 NOTE — DISCHARGE SUMMARY
Discharge Diagnosis  Community acquired pneumonia    Issues Requiring Follow-Up  Pneumonia  UTI    Discharge Meds     Your medication list        START taking these medications        Instructions Last Dose Given Next Dose Due   amoxicillin-pot clavulanate 875-125 mg tablet  Commonly known as: Augmentin      Take 1 tablet (875 mg) by mouth 2 times a day for 5 days.              CHANGE how you take these medications        Instructions Last Dose Given Next Dose Due   metoprolol succinate XL 25 mg 24 hr tablet  Commonly known as: Toprol-XL  What changed: when to take this      TAKE 1 TABLET BY MOUTH  DAILY              CONTINUE taking these medications        Instructions Last Dose Given Next Dose Due   atorvastatin 40 mg tablet  Commonly known as: Lipitor      TAKE 1 TABLET BY MOUTH  DAILY       ferrous sulfate 325 (65 Fe) MG tablet           mirtazapine 15 mg tablet  Commonly known as: Remeron           pantoprazole 40 mg EC tablet  Commonly known as: ProtoNix      Take 1 tablet (40 mg) by mouth 2 times a day before meals.       PARoxetine 10 mg tablet  Commonly known as: Paxil           potassium chloride CR 10 mEq ER tablet  Commonly known as: Klor-Con      TAKE 1 TABLET BY MOUTH DAILY                 Where to Get Your Medications        These medications were sent to [id number] - [place], [place] - [address] [address]  [address] [address] [facility], [place] [address]      Phone: [phone]   amoxicillin-pot clavulanate 875-125 mg tablet         Test Results Pending At Discharge  Pending Labs       No current pending labs.            Hospital Course     89-year-old female who was admitted for metabolic encephalopathy secondary to bilateral community-acquired pneumonia with UTI.  She was also found to have mildly elevated troponins thought to be secondary to demand in the setting of pneumonia/infection.  Echo showed normal EF with diastolic dysfunction.  Ucx growing klebsiella. Pt was started on IV  antibiotics. Mentation improved. PT/OT consulted. Pt continued to improve and was determined to be medically stable for discharge home with home health care and instructions for follow up.     D/c >30min  Pertinent Physical Exam At Time of Discharge  Physical Exam  Constitutional: Well developed, no distress, alert and cooperative, elderly   Skin: Warm and dry  Eyes: EOMI, clear sclera  ENMT: mucous membranes moist  Respiratory: Patent airways, CTAB  Cardiovascular: Regular, rate and rhythm  Abdominal: Nondistended, soft, non-tender, +BS  MSK: ROM intact, bilateral LE edema L>R, tender to palpation  Neuro: alert and oriented x3    Outpatient Follow-Up  Future Appointments   Date Time Provider Department Center   10/30/2023 11:00 AM DO [id number] Bob Frye DO

## 2023-10-26 NOTE — NURSING NOTE
0/26/2023  0900-  Patient ambulated to bathroom with standby assist and walker. Pt reported SOB upon sitting up in bed that resolved with rest. Son at bedside.

## 2023-10-26 NOTE — CARE PLAN
Problem: Skin  Goal: Decreased wound size/increased tissue granulation at next dressing change  Outcome: Progressing  Goal: Participates in plan/prevention/treatment measures  Outcome: Progressing  Goal: Prevent/manage excess moisture  Outcome: Progressing  Goal: Prevent/minimize sheer/friction injuries  Outcome: Progressing  Goal: Promote/optimize nutrition  Outcome: Progressing  Goal: Promote skin healing  Outcome: Progressing

## 2023-10-26 NOTE — NURSING NOTE
Patient alert and oriented x3. Patient amubulatory with walker and staff assist. Patient contient during the night. No acute changes this shift. Patient slept well during the night

## 2023-10-26 NOTE — DISCHARGE INSTRUCTIONS
New medications were sent to your pharmacy, please review your list carefully  -Please follow-up with your primary care provider in 7 to 14 days, please call to schedule

## 2023-10-26 NOTE — PROGRESS NOTES
Son and family chose [facility] as FOC and [facility] as 2nd choice. Referrals sent to both facilities.       Jesika Duffy RN

## 2023-10-27 NOTE — PROGRESS NOTES
Occupational Therapy    Occupational Therapy    Evaluation/Treatment    Patient Name: Shaina Roberts  MRN: [id number]  : 1932  Today's Date: 10/27/23  Time Calculation  Start Time: 1023  Stop Time: 1041  Time Calculation (min): 18 min  Rm: 3017        Assessment:  OT Assessment: Pt pleasant and cooperative. Pt appears clsoe to baseline level of function which is independent. Recommend home with [facility] OT to ensure safety and independence with ADL's.  Prognosis: Good  End of Session Communication: Bedside nurse  End of Session Patient Position: Up in chair, Alarm on  OT Assessment Results: Decreased endurance, Decreased functional mobility, Decreased ADL status  Prognosis: Good    Plan:  Treatment Interventions: ADL retraining, Endurance training, Neuromuscular reeducation  OT Frequency: 2 times per week  OT Recommended Transfer Status: Stand by assist  Treatment Interventions: ADL retraining, Endurance training, Neuromuscular reeducation  Subjective     Current Problem:  Patient Active Problem List   Diagnosis    Anemia    Anxiety disorder    Benign essential hypertension    Bilateral leg edema    Lymphedema of right lower extremity    CAD S/P percutaneous coronary angioplasty    Recurrent UTI    COPD (chronic obstructive pulmonary disease) (CMS/HCC)    Depression, recurrent (CMS/HCC)    Hiatal hernia    Hypercholesteremia    Hypokalemia    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    Thyroid nodule    Venous insufficiency    Cellulitis of right lower extremity    Chronic respiratory failure with hypoxia (CMS/HCC)    Angina pectoris (CMS/HCC)    Community acquired pneumonia    Elevated troponin       General:   OT Received On: 10/26/23  General  Reason for Referral: ADL's; Safety Assessment  Referred By: Lidia Frye DO  Past Medical History Relevant to Rehab: COPD, HTN  Family/Caregiver Present: Yes (daughter)  Prior to Session Communication: Bedside nurse  Patient Position Received: Up in chair, Alarm  on    Precautions:  Medical Precautions: Fall precautions    Pain:  Pain Assessment  Pain Assessment: 0-10  Pain Score: 0 - No pain  Objective     Cognition:  Overall Cognitive Status: Within Functional Limits    Home Living:  Home Living Comments: Pt lives alone in a 1 floor house with 1 small JALEN and no basement. Pt was completing all ADL's. Pt does not get into shower and sponge bathes. Pt was able to complete light household tasks. Pt has assist with meals and heavier household tasks from daughter. Pt was ambulatig using a rollator and has not had any recent falls.    ADL History:  Eating Assistance: Independent  Grooming Assistance: Independent  Bathing Assistance: Stand by  UE Dressing Assistance: Stand by  LE Dressing Assistance: Stand by  Toileting Assistance with Device: Independent    Activity Tolerance:  Endurance: Endurance does not limit participation in activity    Bed Mobility/Transfers:    Transfers  Transfer: Yes  Transfer 1  Transfer From 1: Sit to, Stand to  Transfer to 1: Stand, Sit  Transfer Level of Assistance 1: Close supervision    Strength:  Strength Comments: WFL    Extremities: RUE   RUE : Within Functional Limits and LUE   LUE: Within Functional Limits    Outcome Measures: [facility] Daily Activity  Putting on and taking off regular lower body clothing: A little  Bathing (including washing, rinsing, drying): A little  Putting on and taking off regular upper body clothing: None  Toileting, which includes using toilet, bedpan or urinal: None  Taking care of personal grooming such as brushing teeth: None  Eating Meals: None  Daily Activity - Total Score: 22      EDUCATION:  Education  Individual(s) Educated: Patient  Patient Response to Education: Patient/Caregiver Verbalized Understanding of Information    Goals:  Encounter Problems       Encounter Problems (Active)       OT Goals       OT Goal 1 (Progressing)       Start:  10/26/23    Expected End:  11/09/23       Pt will complete all bed  mobility independently          OT Goal 2 (Progressing)       Start:  10/26/23    Expected End:  11/09/23       Pt will complete ADL's and mobility with good sit balance and good stand balance           OT Goal 3 (Progressing)       Start:  10/26/23    Expected End:  11/09/23       Pt with complete all transfers safely with modified independence           OT Goal 4 (Progressing)       Start:  10/26/23    Expected End:  11/09/23       Pt will complete UB dressing ADL's with modified independence using adaptive device(s) as needed           OT Goal 5 (Progressing)       Start:  10/26/23    Expected End:  11/09/23       Pt with complete grooming ADL's with independence and good stand balance                 Treatment: Pt was up in bedside chair. Pt completed all transfers with SBA. Pt ambulated in room with SBA using wheeled walker. Pt stood at sink with fair+ stand balance to complete grooming ADL's. Pt returned to and remained in bedside chair with chair alarm on and call light within reach.

## 2023-10-27 NOTE — PROGRESS NOTES
Discharge Facility: [facility]  Discharge Diagnosis: Community acquired pneumonia  Admission Date: 10/23/2023  Discharge Date: 10/26/2023    PCP Appointment Date: 10/30/2023  Specialist Appointment Date: none  Hospital Encounter and Summary: Linked     START taking:  amoxicillin-pot clavulanate (Augmentin)    No TCM call completed. Patient has an appointment within 2 business days of discharge.

## 2023-11-01 NOTE — TELEPHONE ENCOUNTER
Gricel from [facility] called she said Pts son requesting an order for a urine test to check for a UTI. Gricel's # is [phone] I gave Gricel a verbal order. They will fax over an order to sign.

## 2023-11-03 PROBLEM — J18.9 PNEUMONIA, UNSPECIFIED ORGANISM: Status: ACTIVE | Noted: 2023-01-01

## 2023-11-03 NOTE — ED PROVIDER NOTES
HPI   Chief Complaint   Patient presents with    Shortness of Breath       Patient is a 89-year-old female with a past medical history of CAD s/p PCI, COPD, hypertension and hyperlipidemia who presents to the ED via EMS with chief complaint of shortness of breath.  Patient was discharged from the hospital one week ago with pneumonia but reports that she is experiencing an increase in shortness of breath in the last 2-3 days.  States she has chronic congestion, rhinorrhea, and dizziness and has been experiencing nausea and vomiting.  She reports 1 episode of diarrhea.  Denies fevers and chills.                        Christy Coma Scale Score: 15                  Patient History   Past Medical History:   Diagnosis Date    Essential (primary) hypertension     Benign essential HTN    Other conditions influencing health status     History of renal stent     Past Surgical History:   Procedure Laterality Date    CHOLECYSTECTOMY  08/31/2018    Cholecystectomy    CORONARY ANGIOPLASTY WITH STENT PLACEMENT  08/31/2018    Cath Placement Of Stent 1    OTHER SURGICAL HISTORY  08/31/2018    Treatment Of Hip Fracture     No family history on file.  Social History     Tobacco Use    Smoking status: Never    Smokeless tobacco: Never   Vaping Use    Vaping Use: Never used   Substance Use Topics    Alcohol use: Not on file    Drug use: Not on file       Physical Exam   ED Triage Vitals [11/03/23 1304]   Temp Heart Rate Resp BP   36.5 °C (97.7 °F) 98 24 (!) 176/94      SpO2 Temp src Heart Rate Source Patient Position   93 % -- -- --      BP Location FiO2 (%)     -- --     REVIEW OF SYSTEMS:    CONSTITUTIONAL: Denies fever, sweats, chills.   NEURO: Denies headache.   HEENT: Reports rhinorrhea  CARDIO: Denies chest pain, palpitations.  PULM: Reports shortness of breath, cough.   GI: Denies abdominal pain, nausea, vomiting  MSK: Reports back pain Denies extremity pain         Physical Exam  Vitals and nursing note reviewed.    Cardiovascular:      Rate and Rhythm: Normal rate and regular rhythm.      Pulses: Normal pulses.      Heart sounds: Normal heart sounds. No murmur heard.     No gallop.   Pulmonary:      Effort: Tachypnea present.      Breath sounds: Examination of the right-lower field reveals decreased breath sounds and rhonchi. Examination of the left-lower field reveals decreased breath sounds and rhonchi. Decreased breath sounds and rhonchi present.   Chest:      Chest wall: No tenderness.   Abdominal:      General: Bowel sounds are normal.      Palpations: Abdomen is soft.      Tenderness: There is no abdominal tenderness.   Musculoskeletal:      Right lower leg: Edema present.      Left lower leg: Edema present.   Skin:     General: Skin is warm and dry.      Capillary Refill: Capillary refill takes less than 2 seconds.   Neurological:      Mental Status: She is alert.      Motor: Weakness present.         ED Course & MDM   Diagnoses as of 11/03/23 2046   Pneumonia, unspecified organism       Medical Decision Making  =================Attending note===============    The patient was seen by the resident/fellow.  I have personally performed a substantive portion of the encounter.  I have seen and examined the patient; agree with the workup, evaluation, MDM,   management and diagnosis.  The care plan has been discussed with the resident; I have reviewed the resident's note and agree with the documented findings.      This is a 89 y.o. female who presents to ER with weakness and fatigue and shortness of breath and low oxygen.  Patient was brought in by EMS.  Her O2 saturation was 82% on room air on arrival there.  She been feeling bad for around 2 days now.  She was just discharged in the hospital about a week ago with pneumonia.  She is normally not on home oxygen.  There is been no fevers or chills or diaphoresis.  She does have some chronic congestion and rhinorrhea.  She has had some nausea and vomiting.  She had 1 episode  of diarrhea.  She has some chronic dizziness that is at baseline.  She does have a history of COPD.  She denies any hypertension or diabetes or hyperlipidemia.  No history of blood clots.  No recent surgeries.  No known drug allergies.  She is not  a smoker.  Lungs are diminished with a few scattered rhonchi.  Heart is regular.  Abdomen is soft and nontender.  Oromucosa is dry.    Plan is indeterminate.  No significant delta troponin on repeat.  White count is elevated.  X-ray is consistent with left basilar pneumonia.    Patient is started on antibiotics and admitted to the hospital.            ==============Resident Note============================      Patient's CBC showed elevated WBC of 20.6 which is significantly more elevated compared her last CBC completed on 10/26.  Patient's CMP, magnesium COVID and influenza were negative.  Serial troponins were elevated at 20 and 22.  BNP was also elevated at 928.  Patient's checks x-ray showed left basilar airspace consolidation which compared to her previous X ray showed worsening in her pneumonia.  Patient was given a breathing treatment DuoNeb, and IV fluid bolus and started on empiric IV vancomycin and Zosyn.  Medicine (Dr. Rodriguez) was consulted for admission to the medicine floor for treatment of her pneumonia.  Medicine agreed with the plan to admit patient.  Patient and family agreed with decision to admit patient for treatment.  At transfer, patient's vitals were 162/93, temp 36.5 °C, HR 99, respiratory 24, and oxygen saturation 99% on 6 L nasal cannula.        Procedure  Procedures     Beulah Mcucrdy MD  Resident  11/03/23 1011       Esdras Nielsen,   11/03/23 1874

## 2023-11-03 NOTE — PROGRESS NOTES
"Vancomycin Dosing by Pharmacy- INITIAL    Shaina Roberts is a 89 y.o. year old female who Pharmacy has been consulted for vancomycin dosing for pneumonia. Based on the patient's indication and renal status this patient will be dosed based on a goal AUC of 400-600.     Renal function is currently stable.    Visit Vitals  /67   Pulse 84   Temp 37 °C (98.6 °F) (Tympanic)   Resp 16        Lab Results   Component Value Date    CREATININE 0.93 11/03/2023    CREATININE 0.65 10/25/2023    CREATININE 0.69 10/24/2023    CREATININE 0.82 10/23/2023        Patient weight is No results found for: \"PTWEIGHT\"    No results found for: \"CULTURE\"     No intake/output data recorded.  [unfilled]    No results found for: \"PATIENTTEMP\"       Assessment/Plan     Patient has already been given a loading dose of 1000 mg.  Will initiate vancomycin maintenance, 1000mg every 24 hrs    This dosing regimen is predicted by [employer] to result in the following pharmacokinetic parameters:    Loading dose: N/A  Regimen: 1000 mg IV every 24 hours.  Start time: 03:00 on 11/04/2023  Exposure target: AUC24 (range)400-600 mg/L.hr   AUC24,ss: 516 mg/L.hr  Probability of AUC24 > 400: 78 %  Ctrough,ss: 16.1 mg/L  Probability of Ctrough,ss > 20: 29 %  Probability of nephrotoxicity (Lodise CE 2009): 11 %    Follow-up level will be ordered on 11/4 at 1st am draw unless clinically indicated sooner.  Will continue to monitor renal function daily while on vancomycin and order serum creatinine at least every 48 hours if not already ordered.  Follow for continued vancomycin needs, clinical response, and signs/symptoms of toxicity.       Gerry Lopez, PharmD       "

## 2023-11-03 NOTE — H&P
History Of Present Illness  Shaina Roberts is a 89 y.o. female presenting with notes of breath, patient was recently discharged from the hospital 10/26/2023 treated for pneumonia bilaterally and UTI, patient received 5 days of Augmentin on discharge, she was feeling short of breath the last 2 to 3 days, reported coughing with yellowish sputum, reported nausea and vomiting along with episode of diarrhea loose stool once daily, denies any fever or chills, called EMS and was found hypoxic 82% placed on 5 L nasal cannula she was not wearing her 2 L nasal cannula, denies any chest pain, denies any urinary symptoms.      On arrival to the ED patient was saturating 93% on 5 L nasal cannula, blood pressure elevated 1 76/94, pulse 98 and afebrile, she had 1 reading of heart rate of 48 that resolved, patient on baseline 2 L nasal cannula at home, labs reviewed and showed worsening leukocytosis 20.6 with left shift,  which is better than previous admission, troponin flat at 20, 22, CXR showed Left basilar airspace consolidation, looks worse than the previous CXR from previous admission, received vanc/zosyn, admitted for PNA      PMH: Hypertension, COPD, CAD s/p stent   PSH: PCI and stent July 2018, hiatal hernia repair, Hemiarthroplasty R hip, cholecystectomy   FH: Denies   SH: Denies any smoking, alcohol, illicit drug use. Lives at home by herself.  Allergies reviewed      Past Medical History  She has a past medical history of Essential (primary) hypertension and Other conditions influencing health status.    Surgical History  She has a past surgical history that includes Other surgical history (08/31/2018); Cholecystectomy (08/31/2018); and Coronary angioplasty with stent (08/31/2018).     Social History  She reports that she has never smoked. She has never used smokeless tobacco. No history on file for alcohol use and drug use.    Family History  No family history on file.     Allergies  Nitrofurantoin,  Nitrofurantoin monohyd/m-cryst, and Orphenadrine    Review of Systems  12 systems were reviewed and were negative expect mentioned in HPI     Physical Exam  Constitutional:       General: She is not in acute distress.     Appearance: Normal appearance.   HENT:      Head: Atraumatic.      Mouth/Throat:      Mouth: Mucous membranes are dry.   Eyes:      Pupils: Pupils are equal, round, and reactive to light.   Cardiovascular:      Rate and Rhythm: Regular rhythm.      Heart sounds: No murmur heard.  Pulmonary:      Breath sounds: Wheezing present.      Comments: Bilateral rhonchi in the lower bases, on 5 L nasal cannula  Abdominal:      General: Bowel sounds are normal. There is no distension.      Palpations: Abdomen is soft.      Tenderness: There is no abdominal tenderness. There is no guarding.   Musculoskeletal:         General: No swelling or tenderness.      Comments: trace edema in the lower extremity, wound to the left leg   Skin:     General: Skin is warm and dry.   Neurological:      General: No focal deficit present.      Mental Status: She is alert and oriented to person, place, and time.      Cranial Nerves: No cranial nerve deficit.      Sensory: No sensory deficit.   Psychiatric:         Mood and Affect: Mood normal.         Behavior: Behavior normal.            Last Recorded Vitals  BP (!) 156/92   Pulse 97   Temp 36.5 °C (97.7 °F)   Resp 22   Wt 63.5 kg (140 lb)   SpO2 94%     Relevant Results        Results for orders placed or performed during the hospital encounter of 11/03/23 (from the past 24 hour(s))   CBC and Auto Differential   Result Value Ref Range    WBC 20.6 (H) 4.4 - 11.3 x10*3/uL    nRBC 0.0 0.0 - 0.0 /100 WBCs    RBC 4.12 4.00 - 5.20 x10*6/uL    Hemoglobin 12.7 12.0 - 16.0 g/dL    Hematocrit 40.1 36.0 - 46.0 %    MCV 97 80 - 100 fL    MCH 30.8 26.0 - 34.0 pg    MCHC 31.7 (L) 32.0 - 36.0 g/dL    RDW 15.3 (H) 11.5 - 14.5 %    Platelets 288 150 - 450 x10*3/uL    Neutrophils % 93.9  40.0 - 80.0 %    Immature Granulocytes %, Automated 1.3 (H) 0.0 - 0.9 %    Lymphocytes % 1.7 13.0 - 44.0 %    Monocytes % 3.0 2.0 - 10.0 %    Eosinophils % 0.0 0.0 - 6.0 %    Basophils % 0.1 0.0 - 2.0 %    Neutrophils Absolute 19.34 (H) 1.60 - 5.50 x10*3/uL    Immature Granulocytes Absolute, Automated 0.26 0.00 - 0.50 x10*3/uL    Lymphocytes Absolute 0.36 (L) 0.80 - 3.00 x10*3/uL    Monocytes Absolute 0.61 0.05 - 0.80 x10*3/uL    Eosinophils Absolute 0.00 0.00 - 0.40 x10*3/uL    Basophils Absolute 0.02 0.00 - 0.10 x10*3/uL   Comprehensive Metabolic Panel   Result Value Ref Range    Glucose 118 (H) 74 - 99 mg/dL    Sodium 139 136 - 145 mmol/L    Potassium 4.0 3.5 - 5.3 mmol/L    Chloride 104 98 - 107 mmol/L    Bicarbonate 18 (L) 21 - 32 mmol/L    Anion Gap 21 (H) 10 - 20 mmol/L    Urea Nitrogen 13 6 - 23 mg/dL    Creatinine 0.93 0.50 - 1.05 mg/dL    eGFR 58 (L) >60 mL/min/1.73m*2    Calcium 9.2 8.6 - 10.3 mg/dL    Albumin 3.6 3.4 - 5.0 g/dL    Alkaline Phosphatase 79 33 - 136 U/L    Total Protein 6.4 6.4 - 8.2 g/dL    AST 17 9 - 39 U/L    Bilirubin, Total 1.0 0.0 - 1.2 mg/dL    ALT 16 7 - 45 U/L   Magnesium   Result Value Ref Range    Magnesium 2.32 1.60 - 2.40 mg/dL   Troponin I, High Sensitivity, Initial   Result Value Ref Range    Troponin I, High Sensitivity 22 (H) 0 - 13 ng/L   Sars-CoV-2 PCR, Symptomatic   Result Value Ref Range    Coronavirus 2019, PCR Not Detected Not Detected   B-type natriuretic peptide   Result Value Ref Range     (H) 0 - 99 pg/mL   Influenza A, and B PCR   Result Value Ref Range    Flu A Result Not Detected Not Detected    Flu B Result Not Detected Not Detected   Troponin, High Sensitivity, 1 Hour   Result Value Ref Range    Troponin I, High Sensitivity 20 (H) 0 - 13 ng/L     Scheduled medications  enoxaparin, 40 mg, subcutaneous, q24h  piperacillin-tazobactam, 3.375 g, intravenous, q6h      Continuous medications     PRN medications  PRN medications: oxygen, oxygen    XR chest 1  view    Result Date: 11/3/2023  Interpreted By:  Quentin Lamb, STUDY: XR CHEST 1 VIEW  11/3/2023 2:35 pm   INDICATION: Signs/Symptoms:shortness of breath   COMPARISON: None.   ACCESSION NUMBER(S): [id number]   ORDERING CLINICIAN: CHANEL CRABTREE   TECHNIQUE: A single AP portable radiograph of the chest was obtained.   FINDINGS: Left basilar airspace consolidation is seen and may represent small to moderate-sized pleural effusion, atelectasis and/or pneumonia. No pneumothorax is identified. The cardiac silhouette is within normal limits for size. A large hiatal hernia is present.       Left basilar airspace consolidation, as above. Clinical correlation and continued follow-up until clearing is recommended.   MACRO: None.   Signed by: Quentin Lamb 11/3/2023 3:08 PM Dictation workstation:   [id number]    Transthoracic Echo (TTE) Complete    Result Date: 10/25/2023            [facility] [address] [facility] [address] [address]    Tel [phone] Fax [phone] TRANSTHORACIC ECHOCARDIOGRAM REPORT  Patient Name:      ARMOND GIL ROSA     Reading Physician:    [id number] Sherif Cash MD Study Date:        10/25/2023          Ordering Provider:    [id number] LESIA SALES MRN/PID:           [id number]            Fellow: Accession#:        [id number]        Nurse: Date of Birth/Age: 7/1/1932 / 89 years Sonographer:          Yanet Roberts RDCS Gender:            F                   Additional Staff: Height:            157.00 cm           Admit Date:           10/24/2023 Weight:            68.49 kg            Admission Status:     Inpatient - Routine BSA:               1.69 m2             Department Location:  [facility] Echo Lab Blood Pressure: 172 /81 mmHg Study Type:    TRANSTHORACIC ECHO (TTE) COMPLETE Diagnosis/ICD: Elevated Troponin-R79.89; Unspecified diastolic (congestive)                 heart failure (CHF)-I50.30 Indication:    Heart failure CPT Codes:     Echo Complete w Full Doppler-[id number]  Study Detail: The following Echo studies were performed: 2D, M-Mode, Doppler and               color flow. Technically challenging study due to patient lying in               supine position.  PHYSICIAN INTERPRETATION: Left Ventricle: Left ventricular systolic function is normal. There are no regional wall motion abnormalities. The left ventricular cavity size is normal. Spectral Doppler shows an impaired relaxation pattern of left ventricular diastolic filling. LVEF 55+/-5%. Borderline image quality, LV with no gross segmental wall motion abnormalities. Left Atrium: The left atrium is severely dilated. Right Ventricle: The right ventricle is moderately enlarged. There is normal right ventricular global systolic function. Right Atrium: The right atrium is moderately dilated. Aortic Valve: The aortic valve was not well visualized. There is no evidence of aortic valve stenosis. There is no evidence of aortic valve regurgitation. The peak instantaneous gradient of the aortic valve is 6.0 mmHg. Mitral Valve: The mitral valve is normal in structure. There is severe mitral annular calcification. There is mild mitral valve regurgitation which is centrally directed. Tricuspid Valve: The tricuspid valve is structurally normal. There is mild to moderate tricuspid regurgitation. The Doppler estimated RVSP is mildly elevated at 36.9 mmHg. Pulmonic Valve: The pulmonic valve is not well visualized. The pulmonic valve regurgitation was not well visualized. Pericardium: There is no pericardial effusion noted. Aorta: The aortic root was not well visualized.  CONCLUSIONS:  1. Left ventricular systolic function is normal.  2. LVEF 55+/-5%.     Borderline image quality, LV with no gross segmental wall motion abnormalities.  3. Spectral Doppler shows an impaired relaxation pattern of left ventricular diastolic filling.  4.  Moderately enlarged right ventricle.  5. The left atrium is severely dilated.  6. The right atrium is moderately dilated.  7. There is severe mitral annular calcification.  8. Mild to moderate tricuspid regurgitation.  9. Mildly elevated RVSP. 10. Aortic valve stenosis is not present. QUANTITATIVE DATA SUMMARY: 2D MEASUREMENTS:                          Normal Ranges: LAs:           3.80 cm   (2.7-4.0cm) IVSd:          1.20 cm   (0.6-1.1cm) LVPWd:         1.20 cm   (0.6-1.1cm) LVIDd:         3.10 cm   (3.9-5.9cm) LVIDs:         2.30 cm LV Mass Index: 67.5 g/m2 LV % FS        25.8 % LA VOLUME:                             Normal Ranges: LA Area A4C:     16.1 cm2 LA Area A2C:     18.6 cm2 LA Volume Index: 28.1 ml/m2 M-MODE MEASUREMENTS:                  Normal Ranges: Ao Root: 2.80 cm (2.0-3.7cm) AoV Exc: 1.60 cm (1.5-2.5cm) AORTA MEASUREMENTS:                    Normal Ranges: AoV Exc:   1.60 cm (1.5-2.5cm) Asc Ao, d: 2.80 cm (2.1-3.4cm) LV SYSTOLIC FUNCTION BY 2D PLANIMETRY (MOD):                     Normal Ranges: EF-A4C View: 56.0 % (>=55%) EF-A2C View: 55.4 % EF-Biplane:  55.0 % LV DIASTOLIC FUNCTION:                        Normal Ranges: MV Peak E:    0.94 m/s (0.7-1.2 m/s) MV Peak A:    1.27 m/s (0.42-0.7 m/s) E/A Ratio:    0.74     (1.0-2.2) MV lateral e' 0.05 m/s MV medial e'  0.05 m/s MITRAL VALVE:                       Normal Ranges: MV Vmax:    1.63 m/s  (<=1.3m/s) MV peak PG: 10.6 mmHg (<5mmHg) MV mean P.0 mmHg  (<48mmHg) MV DT:      338 msec  (150-240msec) AORTIC VALVE:                         Normal Ranges: AoV Vmax:      1.22 m/s (<=1.7m/s) AoV Peak P.0 mmHg (<20mmHg) LVOT Max Kwaku:  0.73 m/s (<=1.1m/s) LVOT Diameter: 2.00 cm  (1.8-2.4cm) AoV Area,Vmax: 1.89 cm2 (2.5-4.5cm2)  RIGHT VENTRICLE: RV Basal 3.20 cm RV Mid   3.00 cm RV Major 5.3 cm TAPSE:   16.5 mm TRICUSPID VALVE/RVSP:                             Normal Ranges: Peak TR Velocity: 2.91 m/s RV Syst Pressure: 36.9 mmHg (< 30mmHg)  [id number]  Sherif Cash MD Electronically signed on 10/25/2023 at 4:07:44 PM  ** Final **     ECG 12 lead    Result Date: 10/24/2023  Normal sinus rhythm Anteroseptal infarct (cited on or before 22-APR-2013) Abnormal ECG When compared with ECG of 30-AUG-2019 21:15, Nonspecific T wave abnormality now evident in Lateral leads Confirmed by Kulwant Pepper ([id number]) on 10/24/2023 11:39:59 AM    Lower extremity venous duplex right    Result Date: 10/23/2023            [facility] [address] [address]     Tel [phone] Fax [phone]  Vascular Lab Report  Lower Venous Duplex Ultrasound Patient Name:     ARMOND LEE    Reading           [id number] Rafal Gil                                      Physician:        MD Study Date:       10/23/2023         Ordering          [id number] RAFAEL DIANA                                      Provider: MRN/PID:          [id number]           Fellow: Accession#:       [id number]       Technologist:     Sarah Rodriguez XIMENA,                                                        RDMS Date of           7/1/1932 / 89      Technologist 2: Birth/Age:        years Gender:           F                  Encounter#:       [id number] Admission Status: Emergency          Location          [facility]                                      Performed:  Diagnosis/ICD: Pain in right leg-M79.604 Indication:    Limb pain.  CONCLUSIONS: Right Lower Venous: No evidence of acute deep vein thrombus visualized in the right lower extremity. Left Lower Venous: Left common femoral vein is negative for deep vein thrombus. Additional Findings: There is a fluid collection in the right popliteal fossa that measures 3.7 x 1 x 2.2cm.  Comparison: Compared with study from 7/8/2022, no significant change.No evidence of DVT.  Imaging & Doppler Findings:  Right                 Compressible Thrombus        Flow Distal External Iliac                None CFV                       Yes         None   Spontaneous/Phasic PFV                       Yes        None FV Proximal               Yes        None   Spontaneous/Phasic FV Mid                    Yes        None FV Distal                 Yes        None Popliteal                 Yes        None   Spontaneous/Phasic Peroneal                  Yes        None PTV                       Yes        None  Left Compress Thrombus        Flow CFV    Yes      None   Spontaneous/Phasic  [id number] Rafal Gil MD Electronically signed by [id number] Rafal Gil MD on 10/23/2023 at 8:17:44 PM  ** Final **     CT angio chest for pulmonary embolism    Result Date: 10/23/2023  STUDY: CT Angiogram of the Chest; 10/23/2023 6:51 PM. INDICATION: Abnormal XR chest, concern for empyema.  Patient with elevated troponin and recent travel.  Evaluate for pulmonary embolism. COMPARISON: XR chest 10/23/2023.  CT chest 10/18/2019. ACCESSION NUMBER(S): [id number] ORDERING CLINICIAN: RAFAEL DIANA TECHNIQUE:  CTA of the chest was performed with intravenous contrast. Images are reviewed and processed at a workstation according to the CT angiogram protocol with 3-D and/or MIP post processing imaging generated.  Omnipaque 350--60 mL was administered intravenously. Automated mA/kV exposure control was utilized and patient examination was performed in strict accordance with principles of ALARA. FINDINGS: Pulmonary arteries are adequately opacified without acute or chronic filling defects.  The thoracic aorta is normal in course and caliber without dissection or aneurysm. The heart is normal in size without pericardial effusion.  There is a large midline diaphragmatic hernia. Thoracic lymph nodes are not enlarged. There is no pleural effusion, pleural thickening, or pneumothorax. The airways are patent. There are infiltrates in both upper and lower lobes. There are no suspicious lung masses or nodules. Upper abdomen demonstrates no acute pathology. There are no acute fractures.  No  suspicious bony lesions.    1. No pulmonary emboli. 2. There are infiltrates in both upper and lower lobes which may represent pneumonia. 3. Large midline diaphragmatic hernia. Signed by Clifford Hernandez MD    XR chest 2 views    Result Date: 10/23/2023  STUDY: Chest Radiographs;  10/23/2023 1:50 PM. INDICATION: Altered mental status. COMPARISON: Chest radiographs 8/30/2019. ACCESSION NUMBER(S): [id number] ORDERING CLINICIAN: RAFAEL DIANA TECHNIQUE:  Frontal and lateral chest. FINDINGS: CARDIOMEDIASTINAL SILHOUETTE: Cardiomediastinal silhouette is normal in size and configuration.  LUNGS: There is a left perihilar infiltrate.  There is a left pleural effusion.  ABDOMEN: There is a retrocardiac mass consistent with a hiatal hernia.  BONES: There is scoliosis with thoracic curve to the left.    Left perihilar infiltrate with pleural effusion. Hiatal hernia. Signed by Raheel Davenport MD    CT head wo IV contrast    Result Date: 10/23/2023  Interpreted By:  Quentin Lamb, STUDY: CT HEAD WO IV CONTRAST; 10/23/2023 2:10 pm   INDICATION: AMS.   COMPARISON: CT head dated 02/07/2018   ACCESSION NUMBER(S): [id number]   ORDERING CLINICIAN: RAFAEL DIANA   TECHNIQUE: Contiguous axial CT images were obtained through the head at 5 mm slice thickness without contrast administration.   FINDINGS: INTRACRANIAL: The ventricles, sulci and basal cisterns are within normal limits for size and configuration. The grey-white differentiation is intact. There is no mass effect or midline shift. There is no extraaxial fluid collection. There is no intracranial hemorrhage.  The calvarium is unremarkable.   EXTRACRANIAL: Visualized paranasal sinuses and mastoids are clear.       No evidence of acute cortical infarct or intracranial hemorrhage.   MACRO: None     Signed by: Quentin Lamb 10/23/2023 2:17 PM Dictation workstation:   [id number]    XR knee right 4+ views    Result Date: 10/23/2023  Interpreted By:  Quentin Lamb, STUDY: XR KNEE  RIGHT 4+ VIEWS  10/23/2023 1:49 pm   INDICATION: Signs/Symptoms:r knee pain   COMPARISON: 09/02/2022   ACCESSION NUMBER(S): [id number]   ORDERING CLINICIAN: RAFAEL DIANA   TECHNIQUE: 4 views of the right knee including AP, lateral and bilateral oblique projections were obtained.   FINDINGS: There is no evidence of acute fracture or dislocation identified. Severe joint space narrowing and moderate marginal osteophyte formation is seen in the lateral compartment of the right. Mild degenerative changes are seen elsewhere in the right knee. Chondral calcifications are present, which can be seen with CPPD arthropathy. No suprapatellar joint effusion is present.       1. No acute fracture or dislocation. 2. Degenerative changes, as described above.   MACRO: None.   Signed by: Quentin Lamb 10/23/2023 2:16 PM Dictation workstation:   [id number]    ECG 12 lead (Clinic Performed)    Result Date: 10/23/2023  Abnormal-see scan        Assessment/Plan   Principal Problem:    Pneumonia, unspecified organism  Leukocytosis due to above  Elevated BNP trending down from last admission  H/o CAD s/p PCI  H/o COPD  H/o HTN  H/o HLD  H/o anxiety/depression       -on 5L NC will wean down as tolerated, baseline O2 on 2L  -Leukocytosis, will continue to monitor  -started on vanco/zosyn, will wait for Legionella and strep pneumonia/MRSA  -Breathing treatment, incentive spirometry, Robitussin  -will give 1L of LR 75 ml/hr, will check lactic acid   -Patient 1 loose stool every day most likely due to Augmentin side effect, not concerned about C. difficile at this time we will continue to monitor and if the frequency worsened we will check for C. difficile  -PT/OT  -Code status DNR/DNI  -We will review home medications and resume them once medication reconciliation is done  -BNP elevated however it is lower than last admission, no signs of heart failure exacerbation, will continue to monitor       Rohan Rodriguez MD

## 2023-11-04 NOTE — SIGNIFICANT EVENT
I was called to the bedside by nursing to assess patient at 03:13 AM, on arrival, there was no response to auditory or noxious stimuli, there was an absence of deep brain reflexes, no sounds from heart or lungs on two minutes of careful listening across precordium.  Patient was pronounced at 03:16AM.  Writer spoke immediately to primary family contact, Mr. Johnsy Roberts, was was available via telephone at the time of this document composition.    Bassam Jones MD  [facility]

## 2023-11-04 NOTE — DISCHARGE SUMMARY
Discharge Diagnosis  Pneumonia, unspecified organism    Issues Requiring Follow-Up  Patient     Hospital Course    Shaina Roberts is a 89 y.o. female presenting with notes of breath, patient was recently discharged from the hospital 10/26/2023 treated for pneumonia bilaterally and UTI, patient received 5 days of Augmentin on discharge, she was feeling short of breath the last 2 to 3 days, reported coughing with yellowish sputum, reported nausea and vomiting along with episode of diarrhea loose stool once daily, denies any fever or chills, called EMS and was found hypoxic 82% placed on 5 L nasal cannula she was not wearing her 2 L nasal cannula, denies any chest pain, denies any urinary symptoms.     On arrival to the ED patient was saturating 93% on 5 L nasal cannula, blood pressure elevated 1 76/94, pulse 98 and afebrile, she had 1 reading of heart rate of 48 that resolved, patient on baseline 2 L nasal cannula at home, labs reviewed and showed worsening leukocytosis 20.6 with left shift,  which is better than previous admission, troponin flat at 20, 22, CXR showed Left basilar airspace consolidation, looks worse than the previous CXR from previous admission, received vanc/zosyn, admitted for pneumonia.    Patient was continued on treatment with antibiotics, using Zosyn and Vancomycin; she had respiratory failure requiring 5L oxygen supplementation.  At 3:13am on 23, RN called physician to bedside that patient thought to have ; she was examined and determined to have passed away peacefully, time of death noted to be 03:16am.  Patient was known to have a documented code status of DNR / DNI.  Writer called the patient's son, Mr. Lul Rboerts, at the time of the writing of this document and updated the family who will come to the hospital to give their final wishes.  The attending physician of record will be updated at the start of shift on 23.    Pertinent Physical Exam At Time of  Discharge  Physical Exam  Constitutional:       Comments: I was called to the bedside by nursing to assess patient, on arrival, there was no response to auditory or noxious stimuli, absence of deep brain reflexes, no sounds from heart on two minutes of careful listening across precordium.         Home Medications     Medication List      STOP taking these medications     amoxicillin-pot clavulanate 875-125 mg tablet; Commonly known as:   Augmentin   atorvastatin 40 mg tablet; Commonly known as: Lipitor   ferrous sulfate 325 (65 Fe) MG tablet   metoprolol succinate XL 25 mg 24 hr tablet; Commonly known as: Toprol-XL   mirtazapine 15 mg tablet; Commonly known as: Remeron   pantoprazole 40 mg EC tablet; Commonly known as: ProtoNix   PARoxetine 10 mg tablet; Commonly known as: Paxil   potassium chloride CR 10 mEq ER tablet; Commonly known as: Klor-Con       Outpatient Follow-Up  No future appointments.    Bassam Jones MD

## 2023-11-04 NOTE — NURSING NOTE
At 0312 staff in room to round on patient. Patient not responding to name, no chest rise noted, no pulse palpated, no blood pressure or heart rate detected. Provider notified. Dr. Jones in room pronounced patient . Dr. Jones called halie Roberts to notify of death. Halie Mccarty in to view patient body. Personal belongings sent with family. Family to notify staff of  home choice

## 2023-11-05 PROCEDURE — 93005 ELECTROCARDIOGRAM TRACING: CPT

## 2023-11-06 ENCOUNTER — APPOINTMENT (OUTPATIENT)
Dept: PRIMARY CARE | Facility: CLINIC | Age: 88
End: 2023-11-06
Payer: COMMERCIAL